# Patient Record
Sex: FEMALE | Race: WHITE | NOT HISPANIC OR LATINO | Employment: OTHER | URBAN - NONMETROPOLITAN AREA
[De-identification: names, ages, dates, MRNs, and addresses within clinical notes are randomized per-mention and may not be internally consistent; named-entity substitution may affect disease eponyms.]

---

## 2017-08-23 ENCOUNTER — TRANSCRIBE ORDERS (OUTPATIENT)
Dept: ADMINISTRATIVE | Facility: HOSPITAL | Age: 62
End: 2017-08-23

## 2017-08-23 DIAGNOSIS — Z12.31 VISIT FOR SCREENING MAMMOGRAM: Primary | ICD-10-CM

## 2017-12-12 ENCOUNTER — OFFICE VISIT (OUTPATIENT)
Dept: CARDIOLOGY | Facility: CLINIC | Age: 62
End: 2017-12-12

## 2017-12-12 VITALS
HEART RATE: 92 BPM | BODY MASS INDEX: 30.39 KG/M2 | DIASTOLIC BLOOD PRESSURE: 70 MMHG | WEIGHT: 193.6 LBS | SYSTOLIC BLOOD PRESSURE: 130 MMHG | HEIGHT: 67 IN

## 2017-12-12 DIAGNOSIS — I25.10 CORONARY ARTERY DISEASE INVOLVING NATIVE CORONARY ARTERY OF NATIVE HEART WITHOUT ANGINA PECTORIS: ICD-10-CM

## 2017-12-12 DIAGNOSIS — E78.5 DYSLIPIDEMIA: ICD-10-CM

## 2017-12-12 DIAGNOSIS — I10 ESSENTIAL HYPERTENSION: ICD-10-CM

## 2017-12-12 DIAGNOSIS — I77.9 LEFT-SIDED CAROTID ARTERY DISEASE (HCC): Primary | ICD-10-CM

## 2017-12-12 PROCEDURE — 99213 OFFICE O/P EST LOW 20 MIN: CPT | Performed by: INTERNAL MEDICINE

## 2017-12-12 RX ORDER — INSULIN GLARGINE 100 [IU]/ML
90 INJECTION, SOLUTION SUBCUTANEOUS NIGHTLY
Refills: 1 | COMMUNITY
Start: 2017-11-21 | End: 2021-02-15 | Stop reason: SDUPTHER

## 2017-12-12 NOTE — PROGRESS NOTES
OFFICE FOLLOW UP     Date of Encounter:2017     Name: Pily Martinez  : 1955  Address: [address] [place] [address] [place] [address]  Phone: [phone]    PCP: Savi Moreira MD  [address] [place] [address] [address] [place] [address]    Pily Martinez is a 62 y.o. female.      Chief Complaint: Follow up of Carotid artery stenosis, CAD, HTN, HLD    Problem List:   1. Carotid artery disease.  a.  Asymptomatic.  b. Chronically occluded LICA.  c. Carotid duplex, 2013:   of LICA, peak velocity ELLE = 134.  d. Carotid duplex, 2016:  LICA, ELLE 50-69%  2. Coronary artery disease.  a.  Probable angina pectoris.   b. Abnormal nuclear myocardial perfusion study, 2014.    c. Normal left ventricular function.   d. Everolimus eluting stents, proximal LAD and mid-circumflex, 2014.    e. Nonobstructive by catheterization, .  f.  Normal nuclear myocardial perfusion study, 2012.  g. [facility] admission for , echo 2014:  EF 60%, no VHD, no wall motion abnormalities.  3.  Asymptomatic bradycardia and rare atrial tachycardia.  a. Evaluated  by Tyson Cardenas MD --not PPM candidate.  4. Diabetes mellitus,  insulin requiring.  5. Hypertension.  6. Dyslipidemia.  7. Family history of coronary artery disease.  8. Gastroesophageal reflux disease.  9. Fibromyalgia.  10. History of  ?pernicious anemia.  11. Status post operations, remote.    Allergies   Allergen Reactions   • Benicar [Olmesartan]    • Lipitor [Atorvastatin]        Current Medications:  •  aspirin  MG by mouth Every Other Day.  •  clopidogrel 75 mg by mouth daily.   •  Duloxetine 60 mg by mouth 2 (two) times a day.  •  glyBURIDE-metFORMIN (GLUCOVANCE) 5-500 MG, 2 tablets by mouth 2 (two) times a day  •  Insulin Aspart (NOVOLOG FLEXPEN SC), Inject 12 Units under the skin 3 (Three) Times a Day.  •  Insulin Glargine (BASAGLAR KWIKPEN) 100 UNIT/ML injection pen, 66 Units Daily.  •  isosorbide mononitrate 30 mg by mouth daily  •  metoprolol  "tartrate 50 MG BY MOUTH TWICE A DAY  •  nitroglycerin 0.4 MG SL tablet as needed for chest pain.   •  rosuvastatin 40 MG by mouth daily.     History of Present Illness:   The patient returns for follow-up today.  Over the last several months her job was phased out and she is now not formally working.  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   She is now receiving disability but is upset and concerned about her lack of mental activity and thinks that her memory might not be what it was a few years ago.  She has not had transient or permanent focal neurologic deficits.  She has rare atypical chest pain not suggesting angina.  She has not had palpitations, presyncope, syncope or symptoms of claudication.      The following portions of the patient's history were reviewed and updated as appropriate: allergies, current medications and problem list.    HPI: Pertinent positives as listed in the HPI.  All other systems reviewed and negative.    Objective:    Vitals:    12/12/17 1303 12/12/17 1312   BP: 125/77 130/70   BP Location: Right arm Right arm   Patient Position: Sitting Standing   Pulse: 90 92   Weight: 87.8 kg (193 lb 9.6 oz)    Height: 170.2 cm (67\")        Physical Exam:  GENERAL: Alert, cooperative, in no acute distress.   HEENT: Fundoscopic deferred, otherwise unremarkable.  NECK: No Jugular venous distention, adenopathy, or thyromegaly noted.   HEART: Regular rhythm, normal rate, and no murmurs, gallops, or rubs.   LUNGS: Clear to auscultation bilaterally. No wheezing, rales or ronchi.  ABDOMEN: Flat without evidence of organomegaly, masses, or tenderness.  NEUROLOGIC: No focal abnormalities involving strength or sensation are noted.   EXTREMITIES: No clubbing, cyanosis, or edema noted.     Diagnostic Data:  No new labs available.    Procedures      Assessment and Plan: I think from a vascular status the patient is doing well.  She is on " best medical treatment.  She is seeing a diabetic specialist in [place] whose name she cannot remember.  She is encouraged to continue this.  I also talked to her about the possible relationship of that occluded internal carotid artery with cognitive function.  Data in this area is unclear; however, I did strongly suggest that she engage in mental acuity building activities on a daily basis and we discussed this in detail.  Assuming that symptoms of carotid and heart disease remained controlled, I would just like to see her back in [place] in one year for a carotid duplex to reassess her carotid disease.  We will arrange this on a day when she comes to visit her diabetic specialist because of transportation issues.  She understands she can contact us at any time if interim problems develop.    Scribed for Vincent Bearden MD by Laurence Lizarraga RN. 12/12/2017 1:10 PM.  I, Vincent Bearden MD, [facility], [facility], personally performed the services described in this documentation as scribed by the above named individual in my presence, and it is both accurate and complete. At 1:23 PM on 12/12/2017        EMR Dragon/Transcription Disclaimer:  Much of this encounter note is an electronic transcription/translation of spoken language to printed text.  The electronic translation of spoken language may permit erroneous, or at times, nonsensical words or phrases to be inadvertently transcribed.  Although I have reviewed the note for such errors, some may still exist.

## 2018-09-27 ENCOUNTER — APPOINTMENT (OUTPATIENT)
Dept: MAMMOGRAPHY | Facility: HOSPITAL | Age: 63
End: 2018-09-27

## 2018-10-03 DIAGNOSIS — I65.22 STENOSIS OF LEFT CAROTID ARTERY: Primary | ICD-10-CM

## 2018-11-16 ENCOUNTER — APPOINTMENT (OUTPATIENT)
Dept: MAMMOGRAPHY | Facility: HOSPITAL | Age: 63
End: 2018-11-16

## 2018-12-13 ENCOUNTER — OFFICE VISIT (OUTPATIENT)
Dept: CARDIOLOGY | Facility: CLINIC | Age: 63
End: 2018-12-13

## 2018-12-13 ENCOUNTER — HOSPITAL ENCOUNTER (OUTPATIENT)
Dept: CARDIOLOGY | Facility: HOSPITAL | Age: 63
Discharge: HOME | End: 2018-12-13
Attending: INTERNAL MEDICINE | Admitting: INTERNAL MEDICINE

## 2018-12-13 VITALS
WEIGHT: 202.4 LBS | SYSTOLIC BLOOD PRESSURE: 156 MMHG | HEIGHT: 67 IN | BODY MASS INDEX: 31.77 KG/M2 | DIASTOLIC BLOOD PRESSURE: 70 MMHG | HEART RATE: 78 BPM

## 2018-12-13 DIAGNOSIS — I25.10 CORONARY ARTERY DISEASE INVOLVING NATIVE CORONARY ARTERY OF NATIVE HEART WITHOUT ANGINA PECTORIS: Primary | ICD-10-CM

## 2018-12-13 DIAGNOSIS — I10 ESSENTIAL HYPERTENSION: ICD-10-CM

## 2018-12-13 DIAGNOSIS — E78.5 DYSLIPIDEMIA: ICD-10-CM

## 2018-12-13 DIAGNOSIS — I65.22 STENOSIS OF LEFT CAROTID ARTERY: ICD-10-CM

## 2018-12-13 LAB
BH CV XLRA MEAS LEFT DIST CCA EDV: 12.6 CM/SEC
BH CV XLRA MEAS LEFT DIST CCA PSV: 39.5 CM/SEC
BH CV XLRA MEAS LEFT MID CCA EDV: 9.4 CM/SEC
BH CV XLRA MEAS LEFT MID CCA PSV: 53.1 CM/SEC
BH CV XLRA MEAS LEFT PROX ECA PSV: 322.6 CM/SEC
BH CV XLRA MEAS LEFT PROX SCLA PSV: 251.4 CM/SEC
BH CV XLRA MEAS LEFT VERTEBRAL A PSV: 88.7 CM/SEC
BH CV XLRA MEAS RIGHT CCA RATIO VEL: 79.6 CM/SEC
BH CV XLRA MEAS RIGHT DIST CCA EDV: 26.5 CM/SEC
BH CV XLRA MEAS RIGHT DIST CCA PSV: 80.3 CM/SEC
BH CV XLRA MEAS RIGHT DIST ICA EDV: 62.9 CM/SEC
BH CV XLRA MEAS RIGHT DIST ICA PSV: 162.8 CM/SEC
BH CV XLRA MEAS RIGHT ICA RATIO VEL: 199 CM/SEC
BH CV XLRA MEAS RIGHT ICA/CCA RATIO: 2.5
BH CV XLRA MEAS RIGHT MID CCA EDV: 25.9 CM/SEC
BH CV XLRA MEAS RIGHT MID CCA PSV: 88 CM/SEC
BH CV XLRA MEAS RIGHT MID ICA EDV: 69.6 CM/SEC
BH CV XLRA MEAS RIGHT MID ICA PSV: 199.8 CM/SEC
BH CV XLRA MEAS RIGHT PROX CCA EDV: 22.6 CM/SEC
BH CV XLRA MEAS RIGHT PROX CCA PSV: 109 CM/SEC
BH CV XLRA MEAS RIGHT PROX ECA PSV: 304.9 CM/SEC
BH CV XLRA MEAS RIGHT PROX ICA EDV: 40.4 CM/SEC
BH CV XLRA MEAS RIGHT PROX ICA PSV: 154.9 CM/SEC
BH CV XLRA MEAS RIGHT PROX SCLA PSV: 157.1 CM/SEC
BH CV XLRA MEAS RIGHT VERTEBRAL A PSV: 83.1 CM/SEC
LEFT ARM BP: NORMAL MMHG
RIGHT ARM BP: NORMAL MMHG

## 2018-12-13 PROCEDURE — 93880 EXTRACRANIAL BILAT STUDY: CPT | Performed by: INTERNAL MEDICINE

## 2018-12-13 PROCEDURE — 93880 EXTRACRANIAL BILAT STUDY: CPT

## 2018-12-13 PROCEDURE — 99214 OFFICE O/P EST MOD 30 MIN: CPT | Performed by: INTERNAL MEDICINE

## 2018-12-13 RX ORDER — LISINOPRIL 5 MG/1
5 TABLET ORAL DAILY
Qty: 30 TABLET | Refills: 11 | Status: SHIPPED | OUTPATIENT
Start: 2018-12-13 | End: 2019-12-17

## 2018-12-13 RX ORDER — ALBUTEROL SULFATE 90 UG/1
2 AEROSOL, METERED RESPIRATORY (INHALATION) AS NEEDED
COMMUNITY

## 2018-12-13 RX ORDER — MULTIVITAMIN WITH IRON
100 TABLET ORAL DAILY
COMMUNITY
End: 2020-12-22

## 2018-12-13 RX ORDER — THIAMINE MONONITRATE (VIT B1) 100 MG
100 TABLET ORAL DAILY
COMMUNITY
End: 2019-12-17

## 2019-02-01 ENCOUNTER — HOSPITAL ENCOUNTER (OUTPATIENT)
Dept: MAMMOGRAPHY | Facility: HOSPITAL | Age: 64
Discharge: HOME | End: 2019-02-01
Admitting: INTERNAL MEDICINE

## 2019-02-01 DIAGNOSIS — Z12.39 SCREENING BREAST EXAMINATION: ICD-10-CM

## 2019-02-01 PROCEDURE — 77067 SCR MAMMO BI INCL CAD: CPT | Performed by: RADIOLOGY

## 2019-02-01 PROCEDURE — 77063 BREAST TOMOSYNTHESIS BI: CPT

## 2019-02-01 PROCEDURE — 77067 SCR MAMMO BI INCL CAD: CPT

## 2019-02-01 PROCEDURE — 77063 BREAST TOMOSYNTHESIS BI: CPT | Performed by: RADIOLOGY

## 2019-02-14 ENCOUNTER — TELEPHONE (OUTPATIENT)
Dept: CARDIOLOGY | Facility: CLINIC | Age: 64
End: 2019-02-14

## 2019-02-14 NOTE — TELEPHONE ENCOUNTER
Pt called to report she has stopped her lisinopril due to cough. Last BP with her PCP was 123/78. She does not have cuff to check BP at home. She was advised to get a cuff or go to pharmacy 2-3 times a week and keep record of BP. If SBP >30 consistently would attempt another HTN med. She is cleared to hold Plavix 5-7 days for tooth extraction if dentist requires. DO NOT stop ASA. Pt aware and agreeable. EVERT

## 2019-12-17 ENCOUNTER — OFFICE VISIT (OUTPATIENT)
Dept: CARDIOLOGY | Facility: CLINIC | Age: 64
End: 2019-12-17

## 2019-12-17 VITALS
BODY MASS INDEX: 28.72 KG/M2 | DIASTOLIC BLOOD PRESSURE: 65 MMHG | HEART RATE: 77 BPM | HEIGHT: 67 IN | SYSTOLIC BLOOD PRESSURE: 129 MMHG | WEIGHT: 183 LBS

## 2019-12-17 DIAGNOSIS — E78.5 DYSLIPIDEMIA: ICD-10-CM

## 2019-12-17 DIAGNOSIS — I65.22 OCCLUSION OF LEFT CAROTID ARTERY: ICD-10-CM

## 2019-12-17 DIAGNOSIS — I10 ESSENTIAL HYPERTENSION: ICD-10-CM

## 2019-12-17 DIAGNOSIS — I25.10 CORONARY ARTERY DISEASE INVOLVING NATIVE CORONARY ARTERY OF NATIVE HEART WITHOUT ANGINA PECTORIS: Primary | ICD-10-CM

## 2019-12-17 PROCEDURE — 99214 OFFICE O/P EST MOD 30 MIN: CPT | Performed by: PHYSICIAN ASSISTANT

## 2019-12-17 RX ORDER — LORATADINE 10 MG/1
CAPSULE, LIQUID FILLED ORAL DAILY
COMMUNITY

## 2019-12-17 RX ORDER — NITROGLYCERIN 0.4 MG/1
0.4 TABLET SUBLINGUAL
Qty: 25 TABLET | Refills: 11 | Status: SHIPPED | OUTPATIENT
Start: 2019-12-17

## 2019-12-17 RX ORDER — CLOPIDOGREL BISULFATE 75 MG/1
75 TABLET ORAL DAILY
Qty: 90 TABLET | Refills: 3 | Status: SHIPPED | OUTPATIENT
Start: 2019-12-17 | End: 2020-12-18

## 2019-12-17 NOTE — PROGRESS NOTES
OFFICE FOLLOW UP     Date of Encounter:2019     Name: Pily Martinez  : 1955  Address: [address] [place] [address] [place] [address]    PCP: Savi Moreira MD  [address] [facility] [address] [place] [address]    Pily Martinez is a 64 y.o. female.    Chief Complaint: Follow up of Carotid stenosis, CAD, HTN, HLD    Problem List:   1. Carotid artery disease.  a.  Asymptomatic.  b. Chronically occluded LICA.  c. Carotid duplex, 2013:   of LICA, peak velocity ELLE = 134.  d. Carotid duplex, 2016:  LICA, ELLE 50-69%  e. Carotid duplex 2018:  of LICA, since 2016, velocities in ELLE are slightly increased with no change in ICA/CCA and velocity criteria consistent with <70% stenosis   2. Coronary artery disease.  a.  Probable angina pectoris.   b. Abnormal nuclear myocardial perfusion study, 2014.    c. Normal left ventricular function.   d. Everolimus eluting stents, proximal LAD and mid-circumflex, 2014.    e. Nonobstructive by catheterization, .  f.  Normal nuclear myocardial perfusion study, 2012.  g. [facility] admission for CP, echo 2014:  EF 60%, no VHD, no wall motion abnormalities.  3.  Asymptomatic bradycardia and rare atrial tachycardia.  a. Evaluated  by Tyson Cardenas MD --not PPM candidate.  4. Diabetes mellitus,  insulin requiring.  5. Hypertension.  6. Dyslipidemia.  7. Family history of coronary artery disease.  8. Gastroesophageal reflux disease.  9. Fibromyalgia.  10. History of  ?pernicious anemia.  11. Status post operations, remote  Allergies:  Allergies   Allergen Reactions   • Benicar [Olmesartan]    • Lipitor [Atorvastatin]      Current Medications:  •  albuterol 108 (90 Base) MCG/ACT inhaler, Inhale 2 puffs 3 (Three) Times a Day.  •  aspirin 81 MG EC tablet, Take 81 mg by mouth Daily.  •  Cholecalciferol (VITAMIN D3) 37998 units tablet, Take  by mouth 1 (One) Time Per Week  •  clopidogrel (PLAVIX) 75 MG tablet, Take 75 mg by mouth daily.  •  Cyanocobalamin  1000 MCG/ML kit, Inject  as directed Every 30 (Thirty) Days.  •  glyBURIDE-metFORMIN (GLUCOVANCE) 5-500 MG per tablet, Take 2 tablets by mouth 2 (two) times a day with meals  •  Insulin Aspart (NOVOLOG FLEXPEN SC), Inject 16 Units under the skin into the appropriate area as directed 3 (Three) Times a Day  •  Insulin Glargine (BASAGLAR KWIKPEN) 100 UNIT/ML injection pen, 90 Units Every Night.  •  Loratadine (CLARITIN) 10 MG capsule, Take  by mouth Daily  •  metoprolol tartrate (LOPRESSOR) 50 MG tablet, TAKE 1 TABLET BY MOUTH TWICE A DAY  •  Misc Natural Products (TART CHERRY ADVANCED PO), Take  by mouth Daily.  •  nitroglycerin (NITROSTAT) 0.4 MG SL tablet, Place 0.4 mg under the tongue every 5 (five) minutes as needed for chest pain. Take no more than 3 doses in 15 minutes.  •  rosuvastatin (CRESTOR) 40 MG tablet, Take  by mouth daily  •  Semaglutide, 1 MG/DOSE, (OZEMPIC) 2 MG/1.5ML solution pen-injector, Inject  under the skin into the appropriate area as directed 1 (One) Time Per Week.  •  TURMERIC PO, Take  by mouth 2 (Two) Times a Day.  •  vitamin B-6 (PYRIDOXINE) 100 MG tablet, Take 100 mg by mouth Daily    History of Present Illness:            Mrs. Martinez returns today for yearly follow up. She denies any cardiovascular or cerebrovascular symptoms since her last visit. She denies chest pain or angina, exertional dyspnea, syncope or heart failure symptoms. She has not had any focal neurological deficits. She does have some dizziness with position changes such as sitting to standing. She states she drinks several diet Dr. Pepper drinks per day and not enough water. She also reports her diabetes is not under good control.     The following portions of the patient's history were reviewed and updated as appropriate: allergies, current medications and problem list.    ROS: Pertinent positives as listed in the HPI.  All other systems reviewed and negative.    Objective:  Vitals:    12/17/19 1048 12/17/19 1053   BP:  "153/77 107/59   BP Location: Right arm Right arm   Patient Position: Sitting Standing   Pulse: 77 79   Weight: 83 kg (183 lb)    Height: 170.2 cm (67\")      Physical Exam:  GENERAL: Alert, cooperative, in no acute distress.   HEENT: Normocephalic, no adenopathy, no jugular venous distention  HEART: No discrete PMI is noted. Regular rhythm, normal rate, and no murmurs, gallops, or rubs.   LUNGS: Clear to auscultation bilaterally. No wheezing, rales or ronchi.  ABDOMEN: Soft, bowel sounds present, non-tender   NEUROLOGIC: No focal abnormalities involving strength or sensation are noted.   EXTREMITIES: No clubbing, cyanosis, or edema noted.     Diagnostic Data:    No new labs available     Carotid duplex 12/2018:  Interpretation Summary     · The left internal carotid artery is occluded.  · Since September 2016, the right internal carotid artery velocities are slightly with no change in ICA/CCA and velocity criteria c/w less than 70% stenosis.       Procedures    Assessment and Plan:   1.  CAD: active and asymptomatic without angina. Continue DAPT and statin.   2.  HTN: orthostatic today and mildly symptomatic with dizziness. Has not tolerated Lisinopril or Losartan in the past. Have asked her to decrease her soda/caffeine intake, and to increase water or sugar free Gatorade intake to stay well hydrated. She understands orthostatic precautions to avoid syncope and was given the instruction sheet today.   3.  HLD: Continue Crestor to target LDL <70, followed by PCP.   4.  Carotid stenosis/  LICA: she has remained asymptomatic without focal neurological defects. Her last carotid duplex is as noted above. She will return for follow up in 1 year with repeat carotid duplex at that time.   5. Follow up in 1 year with carotid duplex at that time, sooner if needed.     Emily Gamino PA-C                   "

## 2020-07-06 NOTE — PROGRESS NOTES
OFFICE FOLLOW UP     Date of Encounter:2018     Name: Pily Martinez  : 1955  Address: [address] [place] [address] [place] [address]  Home Phone:     PCP: Savi Moreira MD  [address] [place] [address] [address] [place] [address]    Pily Martinez is a 63 y.o. female.      Chief Complaint: Follow up of CAD, carotid stenosis, HTN, HLD    Problem List:   1. Carotid artery disease.  a.  Asymptomatic.  b. Chronically occluded LICA.  c. Carotid duplex, 2013:   of LICA, peak velocity ELLE = 134.  d. Carotid duplex, 2016:  LICA, ELLE 50-69%  2. Coronary artery disease.  a.  Probable angina pectoris.   b. Abnormal nuclear myocardial perfusion study, 2014.    c. Normal left ventricular function.   d. Everolimus eluting stents, proximal LAD and mid-circumflex, 2014.    e. Nonobstructive by catheterization, .  f.  Normal nuclear myocardial perfusion study, 2012.  g. [facility] admission for , echo 2014:  EF 60%, no VHD, no wall motion abnormalities.  3.  Asymptomatic bradycardia and rare atrial tachycardia.  a. Evaluated  by Tyson Cardenas MD --not PPM candidate.  4. Diabetes mellitus,  insulin requiring.  5. Hypertension.  6. Dyslipidemia.  7. Family history of coronary artery disease.  8. Gastroesophageal reflux disease.  9. Fibromyalgia.  10. History of  ?pernicious anemia.  11. Status post operations, remote    Allergies   Allergen Reactions   • Benicar [Olmesartan]    • Lipitor [Atorvastatin]        Current Medications:  •  albuterol 108 (90 Base) MCG/ACT inhaler, Inhale 2 puffs 3 (Three) Times a Day.  •  ALPHA LIPOIC ACID-BIOTIN 600 mg by mouth Daily  •  aspirin  mg by mouth Every Other Day.  •  Cholecalcifero 22705 units by mouth 1 (One) Time Per Week   •  clopidogrel 75 mg by mouth daily.  •  Cyanocobalamin 1000 MCG/ML kit, Inject  as directed Every 30 (Thirty) Days.  •  glyBURIDE-metFORMIN (GLUCOVANCE) 5-500 MG Take 2 tablets by mouth 2 times a day   •  Insulin Aspart (NOVOLOG  Physical Therapy Evaluation    Visit Count: 1     Referred by: Miguel Lugo MD; Next provider visit (if known/scheduled): TBD  Medical Diagnosis (from order):   Diagnosis Information      Diagnosis    388.72 (ICD-9-CM) - H92.01 (ICD-10-CM) - Referred otalgia of right ear            Treatment Diagnosis: right BPPV symptoms with Right Benign Paroxymal Positional Vertigo (BPPV)    Date of onset/injury: March 2020, wasn't able to drive   Diagnosis Precautions: right shoulder pain, scheduled for TSA 8/6/20  Chart reviewed at time of initial evaluation (relevant co-morbidities, allergies, tests and medications listed): has had vertigo in the 10 years was treated with PT     SUBJECTIVE   Description of Problem and/or Mechanism of Injury: vertigo with position changes, less intense now, able to drive, notices with turning in bed    Current Symptoms:   Auditory Symptoms: Pain, Dr. Lugo thought ear pain was referred from shoulder   Affected Ear: Right  Visual Symptoms: None  Corrective Lenses: progressives.  Recent change in medication: No   Vertigo Medication: None  Migraines/Headache/eye strain: Yes, episodic  History of concussion: No  Description of dizziness: Spinning or vertigo   Duration of Symptoms: short duration    Function:  Limitations and exacerbating factors: difficulty with position changes  Prior level: no vertigo  Patient Goals: resolve dizziness with position changes.    Prior Treatment: no therapies in the past year for current condition. Hospitalization, home health services or skilled nursing facility in the last 30 days: No, per patient.  Home Environment/Social Support: Patient lives with significant other.  Patient has assistance as needed from family/friends.    Safety:  Do you feel safe at home, work and/or school? yes, per patient  Patient denies 2 or more falls or an unexplained fall with injury in the last year, further testing not required     OBJECTIVE    Posture/Observation/Gait:  Ocular alignment: WNL   Cervical positioning/posture:  WNL       Special Tests:  LeftRightModified Vertebral Artery Screen for vertebral artery insufficiency    Positive test indicated with an \"X\"      Cervical Range of Motion (degrees)  Date Initial    Flexion (80-90) WNL    Extension (70) WNL    Lateral Flexion Left (20-45)    Lateral Flexion Right (20-45)    Rotation Left (70-90)  WNL    Rotation Rigth (70-90)   WNL    standard testing positions unless otherwise noted; ranges are reported in active range of motion unless noted AA=active assistive range of motion and P=passive range of motion; norms included in (); * =pain  All motions within functional/normal limits except as noted.     Oculomotor Exam   Present Comments   Spontaneous Nystagmus     Gaze Holding Nystagmus         Left         Right         Up         Down      Abnormal    Smooth Pursuits     Saccade     Impairment findings indicated with an \"X\"    Vestibular Exam:   Head Impulse Test: Positive left- mildly    Positional Exam:   Indra-Hallpike Test   (anterior and posterior canal) Result       Left Negative       Right Positive:  Nystagmus duration: 8 sec  Nystagmus direction: up right       Roll Test (horizontal canal)        Left Negative       Right Negative   Comments:      Balance/Gait Test:  Walking with horizontal head turns WNL, walking with vertical head movements results in dizziness symptoms.    Outcome Measures: (Outcome Scoring)  Dizziness Handicap Inventory: 28 (scored 0-100, higher score indicates higher impairment)      Initial Treatment   Initial evaluation completed.    Canalith Repositioning:  CRT for right PC x 2    Skilled input: verbal instruction/cues, education/instruction on BPPV, clinical decision-making and application of CRT    Home Program:  * above=instructed home program    BPPV education with self CRT and written reinforcement provided     Writer verbally educated the patient and received  "FLEXPEN SC), Inject 16 Units under the skin as directed 3 Times a Day  •  Insulin Glargine (BASAGLAR KWIKPEN) 100 UNIT/ML 90 Units Every Night.  •  metoprolol tartrate 50 MG BY MOUTH TWICE A DAY  •  nitroglycerin 0.4 MG SL as needed for chest pain.   •  rosuvastatin 40 MG by mouth daily  •  thiamine 100 MG  by mouth Daily.  •  vitamin B-6 50 mg by mouth Daily.    History of Present Illness:  The patient returns for follow-up today.  Since her last visit she stopped Imdur and losartan since she is certain that both of these medications cause headaches.  She does not take home blood pressure readings.  She states that her diabetes is not under control although she does have a diabetic specialist in [facility] (whose name she does not recall this afternoon).  She has had no angina or symptoms of congestive heart failure.              The following portions of the patient's history were reviewed and updated as appropriate: allergies, current medications and problem list.    ROS: Pertinent positives as listed in the HPI.  All other systems reviewed and negative.    Objective:    Vitals:    12/13/18 1337 12/13/18 1339   BP: 156/80 156/70   BP Location: Right arm Right arm   Patient Position: Sitting Standing   Pulse: 76 78   Weight: 91.8 kg (202 lb 6.4 oz)    Height: 170.2 cm (67\")        Physical Exam:  GENERAL: Alert, cooperative, in no acute distress.   HEENT: Normocephalic, no adenopathy, no jugular venous distention  HEART: No discrete PMI is noted. Regular rhythm, normal rate, and no murmurs, gallops, or rubs.   LUNGS: Clear to auscultation bilaterally. No wheezing, rales or ronchi.  ABDOMEN: Soft, bowel sounds present, non-tender   NEUROLOGIC: No focal abnormalities involving strength or sensation are noted.   EXTREMITIES: No clubbing, cyanosis, or edema noted.     Diagnostic Data:  No new labs available to review.     Procedures      Assessment and Plan:   1.  CAD: NYHA I on medical treatment after " revascularization.  2.  HTN: Above target (130 mmHg). Will try Lisinopril, 5 mg by mouth daily and ask for follow-up with her primary care provider to target a systolic blood pressure of less than 130.  3.  HLD:  Her target LDL is less than 70 and she understands this.  4.  DM: She is STRONGLY urger to visit her established diabetologist as needed for control to target.    I, Vincent Bearden MD, personally performed the services described as documented by the above named individual. I have made any necessary edits and it is both accurate and complete 12/13/2018  2:51 PM    I will see Pily Martinez back in one year or sooner on an as needed basis.       Scribed for Vincent Bearden MD by Laurence Lizarraga RN. 12/13/2018 1:47 PM.        EMR Dragon/Transcription Disclaimer:  Much of this encounter note is an electronic transcription/translation of spoken language to printed text.  The electronic translation of spoken language may permit erroneous, or at times, nonsensical words or phrases to be inadvertently transcribed.  Although I have reviewed the note for such errors, some may still exist.              verbal consent from the patient on hand placement, positioning of patient, and techniques to be performed today including hand placement and palpation for techniques as described above and how they are pertinent to the patient's plan of care.     Suggestions for next session as indicated: progress per plan of care, CRT for 1-2 more visits.     ASSESSMENT   54 year old female patient has signs and symptoms consistent with above diagnosis that has reported functional limitations listed above.     Patient will benefit from skilled therapy and rehab potential is good based on assessment above   Predicted patient presentation: Low (stable) - Patient comorbidities and complexities, as defined above, will have little effect on progress for prescribed plan of care.    PLAN   Goals: To be obtained by end of this plan of care:  1. Patient independent with modified and progressed home exercise program.  2. Patient will subjectively report no symptoms of dizziness, nausea, dysequilibrium with head motions and position changes  through improvements listed above patient will:  3. Safely negotiate stairs, curbs and be able to exercise without loss of balance  4. Dizziness Handicap Inventory: Patient will complete form to reflect an improved score from initial score of 28 to less than or equal to 4 to indicate patient reported improvement in function/disability/impairment (minimal detectable change: 17 points).    The following skilled interventions to be implemented to achieve above:  Manual Therapy (08212)  Neuromuscular Re-Education (29116)  Therapeutic Activity (55823)  Therapeutic Exercise (54791)  Canalith Repositioning (93271)    Frequency/Duration: 1 times per week for 6 weeks with tapering as the patient progresses for an estimated total of 4 visits    patient involved in and agreed to plan of care and goals.   Attendance policy provided at time of evaluation.      Patient Education:   Who will be receiving education:  patient  Are they ready to learn: yes  Preferred learning style: written, verbal, demonstration  Barriers to learning: no barriers apparent at this time   Result of initial outlined education: Verbalizes understanding and Demonstrates understanding    Procedures and total treatment time documented in Time Entry flowsheet.

## 2020-07-14 ENCOUNTER — HOSPITAL ENCOUNTER (OUTPATIENT)
Dept: MAMMOGRAPHY | Facility: HOSPITAL | Age: 65
Discharge: HOME | End: 2020-07-14
Admitting: INTERNAL MEDICINE

## 2020-07-14 DIAGNOSIS — Z12.31 VISIT FOR SCREENING MAMMOGRAM: ICD-10-CM

## 2020-07-14 PROCEDURE — 77067 SCR MAMMO BI INCL CAD: CPT

## 2020-07-14 PROCEDURE — 77063 BREAST TOMOSYNTHESIS BI: CPT | Performed by: RADIOLOGY

## 2020-07-14 PROCEDURE — 77063 BREAST TOMOSYNTHESIS BI: CPT

## 2020-07-14 PROCEDURE — 77067 SCR MAMMO BI INCL CAD: CPT | Performed by: RADIOLOGY

## 2020-09-28 ENCOUNTER — TRANSCRIBE ORDERS (OUTPATIENT)
Dept: ADMINISTRATIVE | Facility: HOSPITAL | Age: 65
End: 2020-09-28

## 2020-09-28 DIAGNOSIS — Z01.818 PREOP EXAMINATION: Primary | ICD-10-CM

## 2020-09-29 ENCOUNTER — LAB (OUTPATIENT)
Dept: LAB | Facility: HOSPITAL | Age: 65
End: 2020-09-29

## 2020-09-29 DIAGNOSIS — Z01.818 PREOP EXAMINATION: ICD-10-CM

## 2020-09-29 PROCEDURE — C9803 HOPD COVID-19 SPEC COLLECT: HCPCS

## 2020-09-29 PROCEDURE — U0004 COV-19 TEST NON-CDC HGH THRU: HCPCS

## 2020-09-30 LAB — SARS-COV-2 RNA NOSE QL NAA+PROBE: NOT DETECTED

## 2020-12-18 RX ORDER — CLOPIDOGREL BISULFATE 75 MG/1
TABLET ORAL
Qty: 90 TABLET | Refills: 0 | Status: SHIPPED | OUTPATIENT
Start: 2020-12-18 | End: 2020-12-22 | Stop reason: SDUPTHER

## 2020-12-22 ENCOUNTER — OFFICE VISIT (OUTPATIENT)
Dept: CARDIOLOGY | Facility: CLINIC | Age: 65
End: 2020-12-22

## 2020-12-22 ENCOUNTER — HOSPITAL ENCOUNTER (OUTPATIENT)
Dept: CARDIOLOGY | Facility: HOSPITAL | Age: 65
Discharge: HOME | End: 2020-12-22
Admitting: PHYSICIAN ASSISTANT

## 2020-12-22 VITALS
DIASTOLIC BLOOD PRESSURE: 62 MMHG | HEART RATE: 93 BPM | SYSTOLIC BLOOD PRESSURE: 112 MMHG | BODY MASS INDEX: 27.31 KG/M2 | OXYGEN SATURATION: 98 % | HEIGHT: 67 IN | WEIGHT: 174 LBS

## 2020-12-22 DIAGNOSIS — E78.5 DYSLIPIDEMIA: ICD-10-CM

## 2020-12-22 DIAGNOSIS — I10 ESSENTIAL HYPERTENSION: ICD-10-CM

## 2020-12-22 DIAGNOSIS — I25.10 CORONARY ARTERY DISEASE INVOLVING NATIVE CORONARY ARTERY OF NATIVE HEART WITHOUT ANGINA PECTORIS: ICD-10-CM

## 2020-12-22 DIAGNOSIS — I65.22 OCCLUSION OF LEFT CAROTID ARTERY: ICD-10-CM

## 2020-12-22 DIAGNOSIS — I65.23 BILATERAL CAROTID ARTERY OCCLUSION: Primary | ICD-10-CM

## 2020-12-22 LAB
BH CV ECHO MEAS - BSA(HAYCOCK): 2 M^2
BH CV ECHO MEAS - BSA: 1.9 M^2
BH CV ECHO MEAS - BZI_BMI: 28.7 KILOGRAMS/M^2
BH CV ECHO MEAS - BZI_METRIC_HEIGHT: 170.2 CM
BH CV ECHO MEAS - BZI_METRIC_WEIGHT: 83 KG
BH CV XLRA MEAS LEFT DIST CCA EDV: 9.1 CM/SEC
BH CV XLRA MEAS LEFT DIST CCA PSV: 31.5 CM/SEC
BH CV XLRA MEAS LEFT ICA/CCA RATIO: 0
BH CV XLRA MEAS LEFT MID CCA EDV: 9.9 CM/SEC
BH CV XLRA MEAS LEFT MID CCA PSV: 58.8 CM/SEC
BH CV XLRA MEAS LEFT PROX CCA EDV: 0 CM/SEC
BH CV XLRA MEAS LEFT PROX CCA PSV: 73.6 CM/SEC
BH CV XLRA MEAS LEFT PROX ECA EDV: 62 CM/SEC
BH CV XLRA MEAS LEFT PROX ECA PSV: 279 CM/SEC
BH CV XLRA MEAS LEFT PROX ICA EDV: 0 CM/SEC
BH CV XLRA MEAS LEFT PROX ICA PSV: 0 CM/SEC
BH CV XLRA MEAS LEFT PROX SCLA EDV: 0 CM/SEC
BH CV XLRA MEAS LEFT PROX SCLA PSV: 283 CM/SEC
BH CV XLRA MEAS LEFT VERTEBRAL A EDV: 26 CM/SEC
BH CV XLRA MEAS LEFT VERTEBRAL A PSV: 95.3 CM/SEC
BH CV XLRA MEAS RIGHT DIST CCA EDV: 24.2 CM/SEC
BH CV XLRA MEAS RIGHT DIST CCA PSV: 73.6 CM/SEC
BH CV XLRA MEAS RIGHT DIST ICA EDV: 47.5 CM/SEC
BH CV XLRA MEAS RIGHT DIST ICA PSV: 140 CM/SEC
BH CV XLRA MEAS RIGHT ICA/CCA RATIO: 2.4
BH CV XLRA MEAS RIGHT MID CCA EDV: 19.4 CM/SEC
BH CV XLRA MEAS RIGHT MID CCA PSV: 79.4 CM/SEC
BH CV XLRA MEAS RIGHT MID ICA EDV: 47.5 CM/SEC
BH CV XLRA MEAS RIGHT MID ICA PSV: 144 CM/SEC
BH CV XLRA MEAS RIGHT PROX CCA EDV: 19.4 CM/SEC
BH CV XLRA MEAS RIGHT PROX CCA PSV: 99.7 CM/SEC
BH CV XLRA MEAS RIGHT PROX ECA EDV: 38.9 CM/SEC
BH CV XLRA MEAS RIGHT PROX ECA PSV: 449 CM/SEC
BH CV XLRA MEAS RIGHT PROX ICA EDV: 62 CM/SEC
BH CV XLRA MEAS RIGHT PROX ICA PSV: 189 CM/SEC
BH CV XLRA MEAS RIGHT PROX SCLA EDV: 0 CM/SEC
BH CV XLRA MEAS RIGHT PROX SCLA PSV: 333 CM/SEC
BH CV XLRA MEAS RIGHT VERTEBRAL A EDV: 22.4 CM/SEC
BH CV XLRA MEAS RIGHT VERTEBRAL A PSV: 64.6 CM/SEC
LEFT ARM BP: NORMAL MMHG
RIGHT ARM BP: NORMAL MMHG

## 2020-12-22 PROCEDURE — 93880 EXTRACRANIAL BILAT STUDY: CPT | Performed by: INTERNAL MEDICINE

## 2020-12-22 PROCEDURE — 99214 OFFICE O/P EST MOD 30 MIN: CPT | Performed by: INTERNAL MEDICINE

## 2020-12-22 PROCEDURE — 93880 EXTRACRANIAL BILAT STUDY: CPT

## 2020-12-22 RX ORDER — LEVOTHYROXINE SODIUM 0.03 MG/1
25 TABLET ORAL DAILY
COMMUNITY
End: 2022-05-18 | Stop reason: ALTCHOICE

## 2020-12-22 RX ORDER — MELATONIN
1000 DAILY
COMMUNITY

## 2020-12-22 RX ORDER — CLOPIDOGREL BISULFATE 75 MG/1
75 TABLET ORAL DAILY
Qty: 90 TABLET | Refills: 4 | Status: SHIPPED | OUTPATIENT
Start: 2020-12-22

## 2020-12-22 NOTE — PROGRESS NOTES
[facility] at [facility] - Office Note  Pily Martinez         [address] [place] [address]  1955   [phone] (home)      LOCATION:  [place] office.  Visit Type: Follow Up.    PCP:  Savi Moreira MD    12/22/20   Pily Martinez is a 65 y.o.  female  retired.      Chief Complaint:   FU on Carotid disease, CAD, HTN.    Problem List/PMHx:   1. Carotid artery disease.  a.  Asymptomatic.  b. Chronically occluded LICA.  c. Carotid duplex, July 2013:   of LICA, peak velocity ELLE = 134.  d. Carotid duplex, 9/2016:  LICA, ELLE 50-69%  e. Carotid duplex 12/2018:  of LICA, since 2016, velocities in ELLE are slightly increased with no change in ICA/CCA and velocity criteria consistent with <70% stenosis   2. Coronary artery disease.  a.  Probable angina pectoris.   b. Abnormal nuclear myocardial perfusion study, July 2014.    c. Normal left ventricular function.   d. Everolimus eluting stents, proximal LAD and mid-circumflex, 07/12/2014.    e. Nonobstructive by catheterization, 2009.  f.  Normal nuclear myocardial perfusion study, July 2012.  g. [facility] admission for CP, echo 06/06/2014:  EF 60%, no VHD, no wall motion abnormalities.  3.  Asymptomatic bradycardia and rare atrial tachycardia.  a. Evaluated  by Tyson Cardenas MD --not PPM candidate.  4. Diabetes mellitus,  insulin requiring.  5. Essential Hypertension.  6. Dyslipidemia.  7. Family history of coronary artery disease.  8. Gastroesophageal reflux disease.  9. Fibromyalgia.  10. History of  ?pernicious anemia.  11. Status post operations, remote      Allergies   Allergen Reactions   • Benicar [Olmesartan]    • Lipitor [Atorvastatin]          Current Outpatient Medications:   •  albuterol 108 (90 Base) MCG/ACT inhaler, Inhale 2 puffs 3 (Three) Times a Day., Disp: , Rfl:   •  aspirin 81 MG EC tablet, Take 81 mg by mouth Daily., Disp: , Rfl:   •  cholecalciferol (VITAMIN D3) 25 MCG (1000 UT) tablet,  Take 1,000 Units by mouth Daily., Disp: , Rfl:   •  clopidogrel (PLAVIX) 75 MG tablet, TAKE 1 TABLET BY MOUTH EVERY DAY, Disp: 90 tablet, Rfl: 0  •  glyBURIDE-metFORMIN (GLUCOVANCE) 5-500 MG per tablet, Take 2 tablets by mouth 2 (two) times a day with meals., Disp: , Rfl:   •  Insulin Aspart (NOVOLOG FLEXPEN SC), Inject 16 Units under the skin into the appropriate area as directed 3 (Three) Times a Day., Disp: , Rfl:   •  Insulin Glargine (BASAGLAR KWIKPEN) 100 UNIT/ML injection pen, 90 Units Every Night., Disp: , Rfl: 1  •  levothyroxine (SYNTHROID, LEVOTHROID) 25 MCG tablet, Take 25 mcg by mouth Daily., Disp: , Rfl:   •  Loratadine (CLARITIN) 10 MG capsule, Take  by mouth Daily., Disp: , Rfl:   •  metoprolol tartrate (LOPRESSOR) 50 MG tablet, TAKE 1 TABLET BY MOUTH TWICE A DAY, Disp: , Rfl: 1  •  nitroglycerin (NITROSTAT) 0.4 MG SL tablet, Place 1 tablet under the tongue Every 5 (Five) Minutes As Needed for Chest Pain. Take no more than 3 doses in 15 minutes., Disp: 25 tablet, Rfl: 11  •  Semaglutide, 1 MG/DOSE, (OZEMPIC) 2 MG/1.5ML solution pen-injector, Inject  under the skin into the appropriate area as directed 1 (One) Time Per Week., Disp: , Rfl:     HPI  Pily Martinez is a 66 yo CF here today for her annual follow up on Carotid disease, CAD, HTN, HLP.  Overall, she's doing well from a cardiac standpoint. She has no complaints of angina or anginal equivalent dyspnea, no CHF type symptoms, no palpitations or weakness.  She follows routinely with her PCP and gets routine blood work.  Her biggest issue recently has been her peripheral neuropathy.  She follows with podiatry and just had some work on several calluses.  No current infection.  She is a lot of pain walking particularly with hardwood floors at home.  She denies any falls or injuries to her feet.    Her other issue is that of acid reflux.  She is currently being followed by her PCP for this.      The following portions of the patient's history  "were reviewed in the chart and updated as appropriate: allergies, current medications, past family history, past medical history, past social history, past surgical history and problem list.    Review of Systems   Constitution: Negative for malaise/fatigue.   Cardiovascular: Negative for chest pain, dyspnea on exertion, leg swelling and palpitations.   Respiratory: Negative for cough and shortness of breath.    Hematologic/Lymphatic: Negative for bleeding problem. Does not bruise/bleed easily.   Skin: Negative for color change and poor wound healing.   Musculoskeletal: Positive for myalgias.   Gastrointestinal: Positive for heartburn and nausea.   All other systems reviewed and are negative.            height is 170.2 cm (67\") and weight is 78.9 kg (174 lb). Her blood pressure is 112/62 and her pulse is 93. Her oxygen saturation is 98%.    Vitals:    12/22/20 1021   BP: 112/62   BP Location: Right arm   Patient Position: Sitting   Pulse: 93   SpO2: 98%   Weight: 78.9 kg (174 lb)   Height: 170.2 cm (67\")       Vitals signs reviewed.   Constitutional:       Appearance: Normal appearance. Well-developed.   Pulmonary:      Effort: Pulmonary effort is normal.      Breath sounds: Normal breath sounds. No wheezing. No rhonchi. No rales.   Cardiovascular:      Normal rate. Regular rhythm.   Pulses:     Intact distal pulses.   Abdominal:      General: Bowel sounds are normal.      Palpations: Abdomen is soft.   Neurological:      Mental Status: Alert.           Procedures     Assessment/ Plan   Diagnoses and all orders for this visit:    1. Bilateral carotid artery occlusion (Primary):  Patient underwent repeat carotid duplex today - will ask Dr. Bearden to read and review and we will notify her of results.  Continue ASA, Plavix.  Will address use of statin as directed below.  Currently asymptomatic.  RTC 1 year or sooner PRN.    2. Essential hypertension:  Well controlled.  Continue current medical regimen and get routine " blood work with PCP.    3. Coronary artery disease involving native coronary artery of native heart without angina pectoris:  Stable without current symptoms. Refilled Plavix.  No change in medications today.  RTC 1 year with EKG or sooner PRN.    4.  Dyslipidemia:  Patient follows routinely with PCP.  She was intolerant of statin with BLE myalgias.  She was given Rx for Repatha by her PCP and tolerated well but was not able to get cost coverage and has been off all lipid medications since July of this year.  Will discuss with RN and see if we can get further approval and will notify patient.          Katiana Vasques PA-C functioning independently.  12/22/2020 10:28 EST

## 2020-12-29 ENCOUNTER — TELEPHONE (OUTPATIENT)
Dept: CARDIOLOGY | Facility: CLINIC | Age: 65
End: 2020-12-29

## 2020-12-31 ENCOUNTER — HOSPITAL ENCOUNTER (EMERGENCY)
Dept: HOSPITAL 79 - ER1 | Age: 65
Discharge: HOME | End: 2020-12-31
Payer: COMMERCIAL

## 2020-12-31 VITALS — BODY MASS INDEX: 25.9 KG/M2 | WEIGHT: 165 LBS | HEIGHT: 67 IN

## 2020-12-31 DIAGNOSIS — E11.9: ICD-10-CM

## 2020-12-31 DIAGNOSIS — U07.1: Primary | ICD-10-CM

## 2020-12-31 PROCEDURE — M0239 BAMLANIVIMAB-XXXX INFUSION: HCPCS

## 2021-02-10 ENCOUNTER — TRANSCRIBE ORDERS (OUTPATIENT)
Dept: ADMINISTRATIVE | Facility: HOSPITAL | Age: 66
End: 2021-02-10

## 2021-02-10 DIAGNOSIS — Z01.818 OTHER SPECIFIED PRE-OPERATIVE EXAMINATION: Primary | ICD-10-CM

## 2021-02-12 ENCOUNTER — LAB (OUTPATIENT)
Dept: LAB | Facility: HOSPITAL | Age: 66
End: 2021-02-12

## 2021-02-12 ENCOUNTER — APPOINTMENT (OUTPATIENT)
Dept: LAB | Facility: HOSPITAL | Age: 66
End: 2021-02-12

## 2021-02-12 DIAGNOSIS — Z01.818 OTHER SPECIFIED PRE-OPERATIVE EXAMINATION: ICD-10-CM

## 2021-02-15 RX ORDER — INSULIN GLARGINE 100 [IU]/ML
100 INJECTION, SOLUTION SUBCUTANEOUS NIGHTLY
Qty: 90 ML | Refills: 1 | Status: SHIPPED | OUTPATIENT
Start: 2021-02-15

## 2021-02-15 RX ORDER — INSULIN ASPART 100 [IU]/ML
15 INJECTION, SOLUTION INTRAVENOUS; SUBCUTANEOUS 3 TIMES DAILY
Qty: 40.5 ML | Refills: 0 | Status: SHIPPED | OUTPATIENT
Start: 2021-02-15 | End: 2021-05-16

## 2021-02-23 ENCOUNTER — TRANSCRIBE ORDERS (OUTPATIENT)
Dept: ADMINISTRATIVE | Facility: HOSPITAL | Age: 66
End: 2021-02-23

## 2021-02-23 DIAGNOSIS — Z01.818 OTHER SPECIFIED PRE-OPERATIVE EXAMINATION: Primary | ICD-10-CM

## 2021-02-26 ENCOUNTER — LAB (OUTPATIENT)
Dept: LAB | Facility: HOSPITAL | Age: 66
End: 2021-02-26

## 2021-02-26 DIAGNOSIS — Z01.818 OTHER SPECIFIED PRE-OPERATIVE EXAMINATION: ICD-10-CM

## 2021-02-26 PROCEDURE — U0005 INFEC AGEN DETEC AMPLI PROBE: HCPCS

## 2021-02-26 PROCEDURE — U0004 COV-19 TEST NON-CDC HGH THRU: HCPCS

## 2021-02-26 PROCEDURE — C9803 HOPD COVID-19 SPEC COLLECT: HCPCS

## 2021-02-27 LAB — SARS-COV-2 RNA RESP QL NAA+PROBE: NOT DETECTED

## 2021-03-09 ENCOUNTER — TRANSCRIBE ORDERS (OUTPATIENT)
Dept: ADMINISTRATIVE | Facility: HOSPITAL | Age: 66
End: 2021-03-09

## 2021-03-09 DIAGNOSIS — Z01.818 PRE-OPERATIVE CLEARANCE: Primary | ICD-10-CM

## 2021-03-12 ENCOUNTER — LAB (OUTPATIENT)
Dept: LAB | Facility: HOSPITAL | Age: 66
End: 2021-03-12

## 2021-03-12 DIAGNOSIS — Z01.818 PRE-OPERATIVE CLEARANCE: ICD-10-CM

## 2021-03-12 PROCEDURE — C9803 HOPD COVID-19 SPEC COLLECT: HCPCS

## 2021-03-12 PROCEDURE — U0004 COV-19 TEST NON-CDC HGH THRU: HCPCS

## 2021-03-12 PROCEDURE — U0005 INFEC AGEN DETEC AMPLI PROBE: HCPCS

## 2021-03-13 LAB — SARS-COV-2 RNA NOSE QL NAA+PROBE: NOT DETECTED

## 2021-08-04 ENCOUNTER — HOSPITAL ENCOUNTER (OUTPATIENT)
Dept: HOSPITAL 79 - KOH-I | Age: 66
End: 2021-08-04
Attending: PHYSICIAN ASSISTANT
Payer: COMMERCIAL

## 2021-08-04 DIAGNOSIS — R63.4: Primary | ICD-10-CM

## 2021-08-20 ENCOUNTER — HOSPITAL ENCOUNTER (OUTPATIENT)
Dept: HOSPITAL 79 - CT | Age: 66
End: 2021-08-20
Attending: PHYSICIAN ASSISTANT
Payer: COMMERCIAL

## 2021-08-20 ENCOUNTER — HOSPITAL ENCOUNTER (EMERGENCY)
Dept: HOSPITAL 79 - ER1 | Age: 66
Discharge: HOME | End: 2021-08-20
Payer: COMMERCIAL
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
## 2021-08-20 DIAGNOSIS — R09.89: ICD-10-CM

## 2021-08-20 DIAGNOSIS — Z79.82: ICD-10-CM

## 2021-08-20 DIAGNOSIS — S42.352A: Primary | ICD-10-CM

## 2021-08-20 DIAGNOSIS — R63.4: ICD-10-CM

## 2021-08-20 DIAGNOSIS — M79.606: ICD-10-CM

## 2021-08-20 DIAGNOSIS — S01.81XA: ICD-10-CM

## 2021-08-20 DIAGNOSIS — E11.9: ICD-10-CM

## 2021-08-20 DIAGNOSIS — R10.9: Primary | ICD-10-CM

## 2021-08-25 ENCOUNTER — HOSPITAL ENCOUNTER (OUTPATIENT)
Dept: HOSPITAL 79 - OR | Age: 66
Discharge: HOME | End: 2021-08-25
Attending: ORTHOPAEDIC SURGERY
Payer: COMMERCIAL

## 2021-08-25 VITALS — BODY MASS INDEX: 26.21 KG/M2 | WEIGHT: 167 LBS | HEIGHT: 67 IN

## 2021-08-25 DIAGNOSIS — Z79.891: ICD-10-CM

## 2021-08-25 DIAGNOSIS — I10: ICD-10-CM

## 2021-08-25 DIAGNOSIS — Z79.02: ICD-10-CM

## 2021-08-25 DIAGNOSIS — Z79.4: ICD-10-CM

## 2021-08-25 DIAGNOSIS — I25.10: ICD-10-CM

## 2021-08-25 DIAGNOSIS — S42.202A: Primary | ICD-10-CM

## 2021-08-25 DIAGNOSIS — Z79.82: ICD-10-CM

## 2021-08-25 DIAGNOSIS — Z79.899: ICD-10-CM

## 2021-08-25 DIAGNOSIS — M79.7: ICD-10-CM

## 2021-08-25 DIAGNOSIS — K21.9: ICD-10-CM

## 2021-08-25 DIAGNOSIS — Z95.5: ICD-10-CM

## 2021-08-25 DIAGNOSIS — E11.40: ICD-10-CM
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
## 2021-08-25 LAB
BUN/CREATININE RATIO: 18 (ref 0–10)
BUN/CREATININE RATIO: 21 (ref 0–10)
HGB BLD-MCNC: 10.3 GM/DL (ref 12.3–15.3)
HGB BLD-MCNC: 11.8 GM/DL (ref 12.3–15.3)
RED BLOOD COUNT: 3.25 M/UL (ref 4–5.1)
RED BLOOD COUNT: 3.75 M/UL (ref 4–5.1)
WHITE BLOOD COUNT: 7.2 K/UL (ref 4.5–11)
WHITE BLOOD COUNT: 9.2 K/UL (ref 4.5–11)

## 2021-08-25 PROCEDURE — C1776 JOINT DEVICE (IMPLANTABLE): HCPCS

## 2021-08-25 PROCEDURE — C1713 ANCHOR/SCREW BN/BN,TIS/BN: HCPCS

## 2022-05-18 ENCOUNTER — OFFICE VISIT (OUTPATIENT)
Dept: CARDIOLOGY | Facility: CLINIC | Age: 67
End: 2022-05-18

## 2022-05-18 VITALS
WEIGHT: 151.7 LBS | HEIGHT: 67 IN | BODY MASS INDEX: 23.81 KG/M2 | HEART RATE: 87 BPM | SYSTOLIC BLOOD PRESSURE: 120 MMHG | DIASTOLIC BLOOD PRESSURE: 62 MMHG | OXYGEN SATURATION: 97 %

## 2022-05-18 DIAGNOSIS — G62.9 PERIPHERAL POLYNEUROPATHY: ICD-10-CM

## 2022-05-18 DIAGNOSIS — I25.10 CORONARY ARTERY DISEASE INVOLVING NATIVE CORONARY ARTERY OF NATIVE HEART WITHOUT ANGINA PECTORIS: Primary | ICD-10-CM

## 2022-05-18 DIAGNOSIS — I65.23 BILATERAL CAROTID ARTERY OCCLUSION: ICD-10-CM

## 2022-05-18 DIAGNOSIS — E78.5 DYSLIPIDEMIA: ICD-10-CM

## 2022-05-18 DIAGNOSIS — I73.9 CLAUDICATION: ICD-10-CM

## 2022-05-18 DIAGNOSIS — I10 ESSENTIAL HYPERTENSION: ICD-10-CM

## 2022-05-18 PROCEDURE — 99214 OFFICE O/P EST MOD 30 MIN: CPT | Performed by: INTERNAL MEDICINE

## 2022-05-18 PROCEDURE — 93000 ELECTROCARDIOGRAM COMPLETE: CPT | Performed by: INTERNAL MEDICINE

## 2022-05-18 RX ORDER — EZETIMIBE 10 MG/1
TABLET ORAL
COMMUNITY
Start: 2022-03-21

## 2022-05-18 NOTE — PROGRESS NOTES
[facility]   [address], [address]  [place], [place], [address]    [phone]  [email or web address]           OUTPATIENT CLINIC FOLLOW UP NOTE    Patient Care Team:  Patient Care Team:  Savi Moreira MD as PCP - General    Subjective:      Chief Complaint   Patient presents with   • Bilateral carotid artery occlusion       HPI:    Pily Martinez is a 66 y.o. female.  Previously followed by Dr. Bearden for many years up until 2020    Cardiac focused, problem list:  1. Carotid artery disease.  a.  Asymptomatic.  b. Chronically occluded LICA.  c. Carotid duplex, July 2013:   of LICA, peak velocity ELLE = 134.  d. Carotid duplex, 9/2016:  LICA, ELLE 50-69%  e. Carotid duplex 12/2018:  of LICA, since 2016, velocities in ELLE are slightly increased with no change in ICA/CCA and velocity criteria consistent with <70% stenosis   2. Coronary artery disease.  a.  Probable angina pectoris.   b. Abnormal nuclear myocardial perfusion study, July 2014.    c. Normal left ventricular function.   d. Everolimus eluting stents, proximal LAD and mid-circumflex, 07/12/2014.    e. Nonobstructive by catheterization, 2009.  f. Normal nuclear myocardial perfusion study, July 2012.  g. [facility] admission for CP, echo 06/06/2014:  EF 60%, no VHD, no wall motion abnormalities.  3.  Asymptomatic bradycardia and rare atrial tachycardia.  a. Evaluated  by Tyson Cardenas MD --not PPM candidate.  4. Diabetes mellitus,  insulin requiring.  5. Essential Hypertension.  6. Dyslipidemia.  7. Family history of coronary artery disease.  8. Gastroesophageal reflux disease.  9. Fibromyalgia.  10. History of  ?pernicious anemia.  11. Status post operations, remote  12. Left shoulder repair due to traumatic fall    Since her last visit with cardiology the patient was hospitalized at [facility].  Fell while hospitalized, underwent left shoulder repair.    Has mild swelling, poor balance, discomfort  with walking, right greater than left leg.  Denies chest pain, strokelike symptoms.    Review of Systems:  As noted above in the HPI     PFSH:  Patient Active Problem List   Diagnosis   • Carotid artery disease (HCC)   • Coronary artery disease   • Twan-tachy syndrome (HCC)   • Insulin dependent diabetes mellitus   • Hypertension   • Dyslipidemia   • GERD (gastroesophageal reflux disease)   • Fibromyalgia         Current Outpatient Medications:   •  albuterol 108 (90 Base) MCG/ACT inhaler, Inhale 2 puffs As Needed., Disp: , Rfl:   •  aspirin 81 MG EC tablet, Take 81 mg by mouth Daily., Disp: , Rfl:   •  cholecalciferol (VITAMIN D3) 25 MCG (1000 UT) tablet, Take 1,000 Units by mouth Daily., Disp: , Rfl:   •  clopidogrel (PLAVIX) 75 MG tablet, Take 1 tablet by mouth Daily., Disp: 90 tablet, Rfl: 4  •  ezetimibe (ZETIA) 10 MG tablet, TAKE ONE TABLET BY MOUTH ONCE DAILY FOR CHOLESTEROL, Disp: , Rfl:   •  glyBURIDE-metFORMIN (GLUCOVANCE) 5-500 MG per tablet, Take 2 tablets by mouth 2 (two) times a day with meals., Disp: , Rfl:   •  Insulin Glargine (BASAGLAR KWIKPEN) 100 UNIT/ML injection pen, Inject 100 Units under the skin into the appropriate area as directed Every Night. (Patient taking differently: Inject 120 Units under the skin into the appropriate area as directed Every Night.), Disp: 90 mL, Rfl: 1  •  Loratadine 10 MG capsule, Take  by mouth Daily., Disp: , Rfl:   •  metoprolol tartrate (LOPRESSOR) 50 MG tablet, 50 mg Daily., Disp: , Rfl: 1  •  nitroglycerin (NITROSTAT) 0.4 MG SL tablet, Place 1 tablet under the tongue Every 5 (Five) Minutes As Needed for Chest Pain. Take no more than 3 doses in 15 minutes., Disp: 25 tablet, Rfl: 11  •  Evolocumab with Infusor (REPATHA) solution cartridge, Inject 3.5 mL under the skin into the appropriate area as directed Every 30 (Thirty) Days., Disp: 3.5 mL, Rfl: 11  •  insulin aspart (NovoLOG FlexPen) 100 UNIT/ML solution pen-injector sc pen, Inject 15 Units under the skin  "into the appropriate area as directed 3 (Three) Times a Day for 90 days. (Patient taking differently: Inject 25 Units under the skin into the appropriate area as directed 3 (Three) Times a Day.), Disp: 40.5 mL, Rfl: 0     reports that she has quit smoking. She has never used smokeless tobacco.      Objective:   Physical exam:  /62 (BP Location: Left arm, Patient Position: Sitting)   Pulse 87   Ht 170.2 cm (67\")   Wt 68.8 kg (151 lb 11.2 oz)   SpO2 97%   BMI 23.76 kg/m²   CONSTITUTIONAL: No acute distress  RESPIRATORY: Normal effort. Clear to auscultation bilaterally without wheezing or rales  CARDIOVASCULAR: Carotids with normal upstrokes without bruits.  Regular rate and rhythm with normal S1 and S2. Without murmur. Normal radial pulse. There is no significant lower extremity edema bilaterally.    5/18/2022 exam: Right pedal pulses nonpalpable, left DP and PT pulses 1+, hyperemic toes, spider veins    Labs:    Lab Results   Component Value Date     (H) 07/11/2014     No components found for: LDLDIRECTC    Diagnostic Data:      ECG 12 Lead    Date/Time: 5/18/2022 2:50 PM  Performed by: Vincent Villa MD  Authorized by: Vincent Villa MD   Comparison: compared with previous ECG from 7/11/2014  Similar to previous ECG  Rhythm: sinus rhythm  Comments: Nonspecific T wave abnormality                Assessment and Plan:   Coronary artery disease involving native coronary artery of native heart without angina pectoris  Dyslipidemia  -Without anginal symptoms  -Continue aspirin, clopidogrel, beta-blocker, Zetia  -Obtaining last set of labs from PCP office including a lipid panel  -Statin intolerant  -Repatha previously too expensive.  We will reassess cost and consider once monthly dosing again    Bilateral carotid artery occlusion  -DAPT, cholesterol lowering therapies as noted above  -Repeat carotid duplex ultrasound    Essential hypertension  -Continue current medication    Possible claudication "   Peripheral polyneuropathy   Joint pain, possible arthritis  -Decreased pedal pulses in the right leg, right greater than left claudication-like symptoms  -Medical therapy as noted above for atherosclerosis  -If she did not undergo a lower extremity arterial duplex during her recent OSH hospitalization, will have her undergo 1 at the same time as her repeat carotid duplex    -*Addendum: Obtained OSH arterial duplex ultrasound from 8/2021 which revealed normal ABIs 1.0, bilaterally.  Retrograde DP flow in the RLE, but otherwise triphasic waveforms throughout.    -Will defer on further PAD work-up    - Return in about 1 year (around 5/18/2023) for Next scheduled follow-up with an ECG.    Vincent Villa MD, MSc, FACC, Psychiatric  Interventional Cardiology  [facility]

## 2022-05-31 ENCOUNTER — HOSPITAL ENCOUNTER (OUTPATIENT)
Dept: CARDIOLOGY | Facility: HOSPITAL | Age: 67
Discharge: HOME | End: 2022-05-31
Admitting: INTERNAL MEDICINE

## 2022-05-31 VITALS — HEIGHT: 67 IN | BODY MASS INDEX: 23.81 KG/M2 | WEIGHT: 151.68 LBS

## 2022-05-31 DIAGNOSIS — I65.23 BILATERAL CAROTID ARTERY OCCLUSION: ICD-10-CM

## 2022-05-31 LAB
BH CV XLRA MEAS LEFT DIST CCA EDV: 10.2 CM/SEC
BH CV XLRA MEAS LEFT DIST CCA PSV: 33.4 CM/SEC
BH CV XLRA MEAS LEFT DIST ICA EDV: 0 CM/SEC
BH CV XLRA MEAS LEFT DIST ICA PSV: 0 CM/SEC
BH CV XLRA MEAS LEFT MID CCA EDV: 9.8 CM/SEC
BH CV XLRA MEAS LEFT MID CCA PSV: 50.3 CM/SEC
BH CV XLRA MEAS LEFT MID ICA EDV: 0 CM/SEC
BH CV XLRA MEAS LEFT MID ICA PSV: 0 CM/SEC
BH CV XLRA MEAS LEFT PROX CCA EDV: 0 CM/SEC
BH CV XLRA MEAS LEFT PROX CCA PSV: 59.8 CM/SEC
BH CV XLRA MEAS LEFT PROX ECA EDV: 108 CM/SEC
BH CV XLRA MEAS LEFT PROX ECA PSV: 451 CM/SEC
BH CV XLRA MEAS LEFT PROX ICA EDV: 0 CM/SEC
BH CV XLRA MEAS LEFT PROX ICA PSV: 0 CM/SEC
BH CV XLRA MEAS LEFT PROX SCLA PSV: 225 CM/SEC
BH CV XLRA MEAS LEFT VERTEBRAL A EDV: 33 CM/SEC
BH CV XLRA MEAS LEFT VERTEBRAL A PSV: 102 CM/SEC
BH CV XLRA MEAS RIGHT DIST CCA EDV: 24 CM/SEC
BH CV XLRA MEAS RIGHT DIST CCA PSV: 68.6 CM/SEC
BH CV XLRA MEAS RIGHT DIST ICA EDV: 50.1 CM/SEC
BH CV XLRA MEAS RIGHT DIST ICA PSV: 134 CM/SEC
BH CV XLRA MEAS RIGHT ICA/CCA RATIO: 1.87
BH CV XLRA MEAS RIGHT MID CCA EDV: 31.7 CM/SEC
BH CV XLRA MEAS RIGHT MID CCA PSV: 86.8 CM/SEC
BH CV XLRA MEAS RIGHT MID ICA EDV: 62.9 CM/SEC
BH CV XLRA MEAS RIGHT MID ICA PSV: 153 CM/SEC
BH CV XLRA MEAS RIGHT PROX CCA EDV: 24 CM/SEC
BH CV XLRA MEAS RIGHT PROX CCA PSV: 107 CM/SEC
BH CV XLRA MEAS RIGHT PROX ECA EDV: 55 CM/SEC
BH CV XLRA MEAS RIGHT PROX ECA PSV: 409 CM/SEC
BH CV XLRA MEAS RIGHT PROX ICA EDV: 54 CM/SEC
BH CV XLRA MEAS RIGHT PROX ICA PSV: 162 CM/SEC
BH CV XLRA MEAS RIGHT PROX SCLA PSV: 185 CM/SEC
BH CV XLRA MEAS RIGHT VERTEBRAL A EDV: 28.7 CM/SEC
BH CV XLRA MEAS RIGHT VERTEBRAL A PSV: 70.9 CM/SEC
LEFT ARM BP: NORMAL MMHG
MAXIMAL PREDICTED HEART RATE: 154 BPM
RIGHT ARM BP: NORMAL MMHG
STRESS TARGET HR: 131 BPM

## 2022-05-31 PROCEDURE — 93880 EXTRACRANIAL BILAT STUDY: CPT | Performed by: INTERNAL MEDICINE

## 2022-05-31 PROCEDURE — 93880 EXTRACRANIAL BILAT STUDY: CPT

## 2022-06-01 ENCOUNTER — TELEPHONE (OUTPATIENT)
Dept: CARDIOLOGY | Facility: CLINIC | Age: 67
End: 2022-06-01

## 2022-06-01 NOTE — TELEPHONE ENCOUNTER
----- Message from Vincent Villa MD sent at 5/31/2022  3:19 PM EDT -----  Please let the patient know her carotid duplex ultrasound is unchanged when compared to the 12/2020 study.  ----- Message -----  From: Vincent Villa MD  Sent: 5/31/2022   3:19 PM EDT  To: Vincent Villa MD

## 2022-07-25 ENCOUNTER — HOSPITAL ENCOUNTER (OUTPATIENT)
Dept: HOSPITAL 79 - CT | Age: 67
End: 2022-07-25
Attending: INTERNAL MEDICINE
Payer: MEDICARE

## 2022-07-25 DIAGNOSIS — I70.202: ICD-10-CM

## 2022-07-25 DIAGNOSIS — M79.606: Primary | ICD-10-CM

## 2022-07-25 DIAGNOSIS — R09.89: ICD-10-CM

## 2022-07-25 DIAGNOSIS — I70.8: ICD-10-CM

## 2022-11-15 ENCOUNTER — ANESTHESIA EVENT (OUTPATIENT)
Dept: PERIOP | Facility: HOSPITAL | Age: 67
End: 2022-11-15

## 2022-11-15 RX ORDER — SODIUM CHLORIDE 0.9 % (FLUSH) 0.9 %
10 SYRINGE (ML) INJECTION EVERY 12 HOURS SCHEDULED
Status: CANCELLED | OUTPATIENT
Start: 2022-11-15

## 2022-11-15 RX ORDER — FAMOTIDINE 10 MG/ML
20 INJECTION, SOLUTION INTRAVENOUS ONCE
Status: CANCELLED | OUTPATIENT
Start: 2022-11-15 | End: 2022-11-15

## 2022-11-15 RX ORDER — SODIUM CHLORIDE 0.9 % (FLUSH) 0.9 %
10 SYRINGE (ML) INJECTION AS NEEDED
Status: CANCELLED | OUTPATIENT
Start: 2022-11-15

## 2022-11-16 ENCOUNTER — HOSPITAL ENCOUNTER (OUTPATIENT)
Facility: HOSPITAL | Age: 67
Setting detail: HOSPITAL OUTPATIENT SURGERY
Discharge: HOME | End: 2022-11-16
Attending: SURGERY | Admitting: SURGERY

## 2022-11-16 ENCOUNTER — APPOINTMENT (OUTPATIENT)
Dept: GENERAL RADIOLOGY | Facility: HOSPITAL | Age: 67
End: 2022-11-16

## 2022-11-16 ENCOUNTER — ANESTHESIA (OUTPATIENT)
Dept: PERIOP | Facility: HOSPITAL | Age: 67
End: 2022-11-16

## 2022-11-16 VITALS
TEMPERATURE: 98.5 F | SYSTOLIC BLOOD PRESSURE: 165 MMHG | OXYGEN SATURATION: 99 % | DIASTOLIC BLOOD PRESSURE: 84 MMHG | WEIGHT: 151 LBS | BODY MASS INDEX: 23.7 KG/M2 | RESPIRATION RATE: 16 BRPM | HEIGHT: 67 IN | HEART RATE: 76 BPM

## 2022-11-16 LAB
ANION GAP SERPL CALCULATED.3IONS-SCNC: 11 MMOL/L (ref 5–15)
BASOPHILS # BLD AUTO: 0.01 10*3/MM3 (ref 0–0.2)
BASOPHILS NFR BLD AUTO: 0.2 % (ref 0–1.5)
BUN SERPL-MCNC: 16 MG/DL (ref 8–23)
BUN/CREAT SERPL: 21.1 (ref 7–25)
CALCIUM SPEC-SCNC: 9 MG/DL (ref 8.6–10.5)
CHLORIDE SERPL-SCNC: 100 MMOL/L (ref 98–107)
CO2 SERPL-SCNC: 25 MMOL/L (ref 22–29)
CREAT SERPL-MCNC: 0.76 MG/DL (ref 0.57–1)
DEPRECATED RDW RBC AUTO: 38.5 FL (ref 37–54)
EGFRCR SERPLBLD CKD-EPI 2021: 86 ML/MIN/1.73
EOSINOPHIL # BLD AUTO: 0.12 10*3/MM3 (ref 0–0.4)
EOSINOPHIL NFR BLD AUTO: 2 % (ref 0.3–6.2)
ERYTHROCYTE [DISTWIDTH] IN BLOOD BY AUTOMATED COUNT: 12.1 % (ref 12.3–15.4)
GLUCOSE BLDC GLUCOMTR-MCNC: 289 MG/DL (ref 70–130)
GLUCOSE BLDC GLUCOMTR-MCNC: 305 MG/DL (ref 70–130)
GLUCOSE SERPL-MCNC: 347 MG/DL (ref 65–99)
HBA1C MFR BLD: 11.9 % (ref 4.8–5.6)
HCT VFR BLD AUTO: 41.2 % (ref 34–46.6)
HGB BLD-MCNC: 14.4 G/DL (ref 12–15.9)
IMM GRANULOCYTES # BLD AUTO: 0.04 10*3/MM3 (ref 0–0.05)
IMM GRANULOCYTES NFR BLD AUTO: 0.7 % (ref 0–0.5)
LYMPHOCYTES # BLD AUTO: 2.09 10*3/MM3 (ref 0.7–3.1)
LYMPHOCYTES NFR BLD AUTO: 34.1 % (ref 19.6–45.3)
MCH RBC QN AUTO: 30.6 PG (ref 26.6–33)
MCHC RBC AUTO-ENTMCNC: 35 G/DL (ref 31.5–35.7)
MCV RBC AUTO: 87.5 FL (ref 79–97)
MONOCYTES # BLD AUTO: 0.44 10*3/MM3 (ref 0.1–0.9)
MONOCYTES NFR BLD AUTO: 7.2 % (ref 5–12)
NEUTROPHILS NFR BLD AUTO: 3.43 10*3/MM3 (ref 1.7–7)
NEUTROPHILS NFR BLD AUTO: 55.8 % (ref 42.7–76)
NRBC BLD AUTO-RTO: 0 /100 WBC (ref 0–0.2)
PLATELET # BLD AUTO: 227 10*3/MM3 (ref 140–450)
PMV BLD AUTO: 10.4 FL (ref 6–12)
POTASSIUM SERPL-SCNC: 4.2 MMOL/L (ref 3.5–5.2)
RBC # BLD AUTO: 4.71 10*6/MM3 (ref 3.77–5.28)
SODIUM SERPL-SCNC: 136 MMOL/L (ref 136–145)
WBC NRBC COR # BLD: 6.13 10*3/MM3 (ref 3.4–10.8)

## 2022-11-16 PROCEDURE — C1894 INTRO/SHEATH, NON-LASER: HCPCS | Performed by: SURGERY

## 2022-11-16 PROCEDURE — 85025 COMPLETE CBC W/AUTO DIFF WBC: CPT | Performed by: SURGERY

## 2022-11-16 PROCEDURE — 25010000002 HEPARIN (PORCINE) PER 1000 UNITS: Performed by: NURSE ANESTHETIST, CERTIFIED REGISTERED

## 2022-11-16 PROCEDURE — 25010000002 HEPARIN (PORCINE) PER 1000 UNITS: Performed by: SURGERY

## 2022-11-16 PROCEDURE — C1887 CATHETER, GUIDING: HCPCS | Performed by: SURGERY

## 2022-11-16 PROCEDURE — 25010000002 CEFAZOLIN IN DEXTROSE 2-4 GM/100ML-% SOLUTION: Performed by: SURGERY

## 2022-11-16 PROCEDURE — C1769 GUIDE WIRE: HCPCS | Performed by: SURGERY

## 2022-11-16 PROCEDURE — 75710 ARTERY X-RAYS ARM/LEG: CPT

## 2022-11-16 PROCEDURE — 63710000001 INSULIN LISPRO (HUMAN) PER 5 UNITS: Performed by: ANESTHESIOLOGY

## 2022-11-16 PROCEDURE — 80048 BASIC METABOLIC PNL TOTAL CA: CPT | Performed by: SURGERY

## 2022-11-16 PROCEDURE — 0 LIDOCAINE 1 % SOLUTION: Performed by: SURGERY

## 2022-11-16 PROCEDURE — C1876 STENT, NON-COA/NON-COV W/DEL: HCPCS | Performed by: SURGERY

## 2022-11-16 PROCEDURE — 83036 HEMOGLOBIN GLYCOSYLATED A1C: CPT | Performed by: SURGERY

## 2022-11-16 PROCEDURE — 82962 GLUCOSE BLOOD TEST: CPT

## 2022-11-16 PROCEDURE — 25010000002 PROPOFOL 10 MG/ML EMULSION: Performed by: NURSE ANESTHETIST, CERTIFIED REGISTERED

## 2022-11-16 PROCEDURE — 75726 ARTERY X-RAYS ABDOMEN: CPT

## 2022-11-16 PROCEDURE — C1725 CATH, TRANSLUMIN NON-LASER: HCPCS | Performed by: SURGERY

## 2022-11-16 PROCEDURE — C1753 CATH, INTRAVAS ULTRASOUND: HCPCS | Performed by: SURGERY

## 2022-11-16 DEVICE — PREMOUNTED STENT SYSTEM
Type: IMPLANTABLE DEVICE | Site: LEG | Status: FUNCTIONAL
Brand: EXPRESS® SD RENAL/BILIARY

## 2022-11-16 RX ORDER — MEPERIDINE HYDROCHLORIDE 25 MG/ML
12.5 INJECTION INTRAMUSCULAR; INTRAVENOUS; SUBCUTANEOUS
Status: DISCONTINUED | OUTPATIENT
Start: 2022-11-16 | End: 2022-11-16 | Stop reason: HOSPADM

## 2022-11-16 RX ORDER — SODIUM CHLORIDE 0.9 % (FLUSH) 0.9 %
3 SYRINGE (ML) INJECTION EVERY 12 HOURS SCHEDULED
Status: DISCONTINUED | OUTPATIENT
Start: 2022-11-16 | End: 2022-11-16 | Stop reason: HOSPADM

## 2022-11-16 RX ORDER — NALOXONE HCL 0.4 MG/ML
0.4 VIAL (ML) INJECTION AS NEEDED
Status: DISCONTINUED | OUTPATIENT
Start: 2022-11-16 | End: 2022-11-16 | Stop reason: HOSPADM

## 2022-11-16 RX ORDER — DROPERIDOL 2.5 MG/ML
0.62 INJECTION, SOLUTION INTRAMUSCULAR; INTRAVENOUS
Status: DISCONTINUED | OUTPATIENT
Start: 2022-11-16 | End: 2022-11-16 | Stop reason: HOSPADM

## 2022-11-16 RX ORDER — PROMETHAZINE HYDROCHLORIDE 25 MG/1
25 TABLET ORAL ONCE AS NEEDED
Status: DISCONTINUED | OUTPATIENT
Start: 2022-11-16 | End: 2022-11-16 | Stop reason: HOSPADM

## 2022-11-16 RX ORDER — HYDROMORPHONE HYDROCHLORIDE 1 MG/ML
0.5 INJECTION, SOLUTION INTRAMUSCULAR; INTRAVENOUS; SUBCUTANEOUS
Status: DISCONTINUED | OUTPATIENT
Start: 2022-11-16 | End: 2022-11-16 | Stop reason: HOSPADM

## 2022-11-16 RX ORDER — LIDOCAINE HYDROCHLORIDE 10 MG/ML
0.5 INJECTION, SOLUTION EPIDURAL; INFILTRATION; INTRACAUDAL; PERINEURAL ONCE AS NEEDED
Status: COMPLETED | OUTPATIENT
Start: 2022-11-16 | End: 2022-11-16

## 2022-11-16 RX ORDER — HYDROCODONE BITARTRATE AND ACETAMINOPHEN 5; 325 MG/1; MG/1
1 TABLET ORAL ONCE AS NEEDED
Status: DISCONTINUED | OUTPATIENT
Start: 2022-11-16 | End: 2022-11-16 | Stop reason: HOSPADM

## 2022-11-16 RX ORDER — ACETAMINOPHEN 325 MG/1
650 TABLET ORAL EVERY 4 HOURS PRN
Status: CANCELLED | OUTPATIENT
Start: 2022-11-16

## 2022-11-16 RX ORDER — LIDOCAINE HYDROCHLORIDE 10 MG/ML
INJECTION, SOLUTION INFILTRATION; PERINEURAL AS NEEDED
Status: DISCONTINUED | OUTPATIENT
Start: 2022-11-16 | End: 2022-11-16 | Stop reason: HOSPADM

## 2022-11-16 RX ORDER — CEFAZOLIN SODIUM 2 G/100ML
2 INJECTION, SOLUTION INTRAVENOUS ONCE
Status: COMPLETED | OUTPATIENT
Start: 2022-11-16 | End: 2022-11-16

## 2022-11-16 RX ORDER — HYDRALAZINE HYDROCHLORIDE 20 MG/ML
5 INJECTION INTRAMUSCULAR; INTRAVENOUS
Status: DISCONTINUED | OUTPATIENT
Start: 2022-11-16 | End: 2022-11-16 | Stop reason: HOSPADM

## 2022-11-16 RX ORDER — BUPIVACAINE HCL/0.9 % NACL/PF 0.125 %
PLASTIC BAG, INJECTION (ML) EPIDURAL AS NEEDED
Status: DISCONTINUED | OUTPATIENT
Start: 2022-11-16 | End: 2022-11-16 | Stop reason: SURG

## 2022-11-16 RX ORDER — SODIUM CHLORIDE 0.9 % (FLUSH) 0.9 %
3-10 SYRINGE (ML) INJECTION AS NEEDED
Status: DISCONTINUED | OUTPATIENT
Start: 2022-11-16 | End: 2022-11-16 | Stop reason: HOSPADM

## 2022-11-16 RX ORDER — PROMETHAZINE HYDROCHLORIDE 25 MG/1
25 SUPPOSITORY RECTAL ONCE AS NEEDED
Status: DISCONTINUED | OUTPATIENT
Start: 2022-11-16 | End: 2022-11-16 | Stop reason: HOSPADM

## 2022-11-16 RX ORDER — SODIUM CHLORIDE, SODIUM LACTATE, POTASSIUM CHLORIDE, CALCIUM CHLORIDE 600; 310; 30; 20 MG/100ML; MG/100ML; MG/100ML; MG/100ML
9 INJECTION, SOLUTION INTRAVENOUS CONTINUOUS
Status: DISCONTINUED | OUTPATIENT
Start: 2022-11-16 | End: 2022-11-16 | Stop reason: HOSPADM

## 2022-11-16 RX ORDER — IPRATROPIUM BROMIDE AND ALBUTEROL SULFATE 2.5; .5 MG/3ML; MG/3ML
3 SOLUTION RESPIRATORY (INHALATION) ONCE AS NEEDED
Status: DISCONTINUED | OUTPATIENT
Start: 2022-11-16 | End: 2022-11-16 | Stop reason: HOSPADM

## 2022-11-16 RX ORDER — ONDANSETRON 2 MG/ML
4 INJECTION INTRAMUSCULAR; INTRAVENOUS ONCE AS NEEDED
Status: DISCONTINUED | OUTPATIENT
Start: 2022-11-16 | End: 2022-11-16 | Stop reason: HOSPADM

## 2022-11-16 RX ORDER — DROPERIDOL 2.5 MG/ML
0.62 INJECTION, SOLUTION INTRAMUSCULAR; INTRAVENOUS ONCE AS NEEDED
Status: DISCONTINUED | OUTPATIENT
Start: 2022-11-16 | End: 2022-11-16 | Stop reason: HOSPADM

## 2022-11-16 RX ORDER — MIDAZOLAM HYDROCHLORIDE 1 MG/ML
0.5 INJECTION INTRAMUSCULAR; INTRAVENOUS
Status: DISCONTINUED | OUTPATIENT
Start: 2022-11-16 | End: 2022-11-16 | Stop reason: HOSPADM

## 2022-11-16 RX ORDER — SODIUM CHLORIDE 0.9 % (FLUSH) 0.9 %
10 SYRINGE (ML) INJECTION AS NEEDED
Status: CANCELLED | OUTPATIENT
Start: 2022-11-16

## 2022-11-16 RX ORDER — FENTANYL CITRATE 50 UG/ML
50 INJECTION, SOLUTION INTRAMUSCULAR; INTRAVENOUS
Status: DISCONTINUED | OUTPATIENT
Start: 2022-11-16 | End: 2022-11-16 | Stop reason: HOSPADM

## 2022-11-16 RX ORDER — SODIUM CHLORIDE 0.9 % (FLUSH) 0.9 %
10 SYRINGE (ML) INJECTION EVERY 12 HOURS SCHEDULED
Status: CANCELLED | OUTPATIENT
Start: 2022-11-16

## 2022-11-16 RX ORDER — FAMOTIDINE 20 MG/1
20 TABLET, FILM COATED ORAL ONCE
Status: COMPLETED | OUTPATIENT
Start: 2022-11-16 | End: 2022-11-16

## 2022-11-16 RX ORDER — PROPOFOL 10 MG/ML
VIAL (ML) INTRAVENOUS AS NEEDED
Status: DISCONTINUED | OUTPATIENT
Start: 2022-11-16 | End: 2022-11-16 | Stop reason: SURG

## 2022-11-16 RX ORDER — LABETALOL HYDROCHLORIDE 5 MG/ML
5 INJECTION, SOLUTION INTRAVENOUS
Status: DISCONTINUED | OUTPATIENT
Start: 2022-11-16 | End: 2022-11-16 | Stop reason: HOSPADM

## 2022-11-16 RX ORDER — INSULIN LISPRO 100 [IU]/ML
0-7 INJECTION, SOLUTION INTRAVENOUS; SUBCUTANEOUS
Status: DISCONTINUED | OUTPATIENT
Start: 2022-11-16 | End: 2022-11-16 | Stop reason: HOSPADM

## 2022-11-16 RX ORDER — HEPARIN SODIUM 1000 [USP'U]/ML
INJECTION, SOLUTION INTRAVENOUS; SUBCUTANEOUS AS NEEDED
Status: DISCONTINUED | OUTPATIENT
Start: 2022-11-16 | End: 2022-11-16 | Stop reason: SURG

## 2022-11-16 RX ADMIN — PROPOFOL 75 MCG/KG/MIN: 10 INJECTION, EMULSION INTRAVENOUS at 10:30

## 2022-11-16 RX ADMIN — LIDOCAINE HYDROCHLORIDE 0.5 ML: 10 INJECTION, SOLUTION EPIDURAL; INFILTRATION; INTRACAUDAL; PERINEURAL at 09:34

## 2022-11-16 RX ADMIN — FAMOTIDINE 20 MG: 20 TABLET, FILM COATED ORAL at 09:34

## 2022-11-16 RX ADMIN — Medication 100 MCG: at 10:53

## 2022-11-16 RX ADMIN — INSULIN LISPRO 5 UNITS: 100 INJECTION, SOLUTION INTRAVENOUS; SUBCUTANEOUS at 09:54

## 2022-11-16 RX ADMIN — SODIUM CHLORIDE, POTASSIUM CHLORIDE, SODIUM LACTATE AND CALCIUM CHLORIDE 9 ML/HR: 600; 310; 30; 20 INJECTION, SOLUTION INTRAVENOUS at 09:34

## 2022-11-16 RX ADMIN — HEPARIN SODIUM 7000 UNITS: 1000 INJECTION, SOLUTION INTRAVENOUS; SUBCUTANEOUS at 10:40

## 2022-11-16 RX ADMIN — PROPOFOL 50 MG: 10 INJECTION, EMULSION INTRAVENOUS at 10:32

## 2022-11-16 RX ADMIN — CEFAZOLIN SODIUM 2 G: 2 INJECTION, SOLUTION INTRAVENOUS at 10:33

## 2022-11-16 NOTE — ANESTHESIA PREPROCEDURE EVALUATION
Anesthesia Evaluation     Patient summary reviewed and Nursing notes reviewed                Airway   Mallampati: II  TM distance: >3 FB  Neck ROM: full  No difficulty expected  Dental - normal exam     Pulmonary - negative pulmonary ROS and normal exam   Cardiovascular - normal exam    (+) hypertension, CAD, cardiac stents (LAD, circ 2014; asa/plavix tx (yesterday))  carotid artery disease (chronically occluded LICA, ELLE 50-70%)      Neuro/Psych- negative ROS  GI/Hepatic/Renal/Endo    (+)  GERD,  diabetes mellitus,     Musculoskeletal (-) negative ROS    Abdominal  - normal exam    Bowel sounds: normal.   Substance History - negative use     OB/GYN negative ob/gyn ROS         Other                      Anesthesia Plan    ASA 3     MAC     intravenous induction     Anesthetic plan, risks, benefits, and alternatives have been provided, discussed and informed consent has been obtained with: patient.    Plan discussed with CRNA.        CODE STATUS:

## 2022-11-16 NOTE — OP NOTE
AORTAGRAM WITH OR WITHOUT RUNOFFS POSSIBLE STENT  Procedure Report    Patient Name:  Pily Martinez  YOB: 1955    Date of Surgery:  11/16/2022     Indications: This is a 67-year-old female with chronic mesenteric ischemia and weight loss.  She is also experiencing bilateral lower extremity claudication.  She has been offered mesenteric angiography with potential stenting as well as right lower extremity angiography.    Pre-op Diagnosis:   Chronic mesenteric angina with lower extremity claudication    Post-Op Diagnosis Codes:  Chronic mesenteric angina with lower extremity claudication    Procedure/CPT® Codes:      Procedure(s):  ARTERIOGRAM OF SUPERIOR MESENTERIC ARTERY,   SUPERIOR MESENTERIC ARTERY STENT PLACEMENT,   SUPERIOR MESENTERIC INTRAVASCULAR ULTRASOUND,   RIGHT POPLITEAL AND SUPERFICIAL FEMORAL ARTERY ANGIOPLASTY    Staff:  Surgeon(s):  Nickolas Florentino MD    Circulator: Shelbie Iverson RN; Leonor Mclaughlin RN; Tanna Kelsey RN  Scrub Person: David Baum  Nursing Assistant: Nathaly Reagan PCT             Anesthesia: Monitored Anesthesia Care    Estimated Blood Loss: minimal    Implants:    Implant Name Type Inv. Item Serial No.  Lot No. LRB No. Used Action   STNT [employer] SD 6F [id number] 150CM - [id number] Stent STNT MON[employer] SD 6F [id number] 150CM  [employer] [id number] N/A 1 Implanted       Specimen:          None        Findings:    1.  Selective mesenteric angiogram demonstrated patency of the celiac and superior mesenteric artery    2.  Intravascular ultrasound demonstrating severe stenosis of the ostium of the superior mesenteric artery greater than 70%.  This was stented with a 6 x 14 balloon mounted renal SD stent    3.  Right lower extremity angiography demonstrating severe stenosis of the popliteal artery at the P2 segment as well as severe ostial superficial femoral artery stenosis.  Both are treated with 5 mm chocolate balloon angioplasty.   No residual stenosis persisted.  Runoff of the right lower extremity was through right posterior tibial and peroneal artery    Complications: None    Description of Procedure:    1.  Patient was taken back to the operating placed in supine position on the operative table.  Following IV sedation bilateral groins were widely prepped and draped in standard sterile fashion.  Timeout was taken with identification of the patient and procedure.  Under ultrasound guidance left common femoral artery was accessed successfully.  6 Wolof sheath was placed and a flush catheter was advanced.  Heparin was administered.  Abdominal aortography was performed demonstrating patency of the infrarenal aorta.  Moderate bilateral renal artery stenosis was present.  A lateral view aortography was performed as the superior mesenteric artery was initially not seen.  During this view both SMA and celiac appeared to be widely patent.  This was a discrepancy compared to previous ultrasound.  The superior mesenteric artery was next elected and a 6 Wolof sheath was advanced.  Intravascular ultrasound was performed.  This demonstrated severe stenosis of the ostium of the superior mesenteric artery.  This was over 70% on intravascular interpretation.  This was stented with a 6 x 14 balloon mounted renal SD stent.  Completion angiography demonstrated no residual stenosis.    The aortic bifurcation was next crossed.  Right lower extremity angiography was performed.  There was severe stenosis of the origin of the superficial femoral artery.  The popliteal artery at the P2 junction also had critical stenosis of over 90%.  An 014 wire was used to traverse both lesions.  A 5 chocolate sculpting balloon angioplasty of both popliteal and proximal superficial femoral artery was performed.  No residual stenosis persisted.  Left femoral sheath was removed and Angio-Seal closure device was used successfully.      Nickolas Florentino MD     Date: 11/16/2022  Time:  11:21 EST

## 2022-11-16 NOTE — H&P
Patient Care Team:  Savi Moreira MD as PCP - General    Chief complaint abdominal pain with weight loss and complaints of right leg claudication    Subjective     Patient is a 67 y.o. female presents with significant amount of weight loss.  She has severe abdominal pain which occurs 3-4 times a week.  She has had a arterial duplex demonstrating greater than 70% superior mesenteric artery stenosis.  She is also undergone right leg revascularization but unfortunately she has had no improvements in symptoms.  She still experiences right leg claudication and JD has been unchanged.  Review of Systems   Pertinent items are noted in HPI, all other systems reviewed and negative    History  Past Medical History:   Diagnosis Date   • Twan-tachy syndrome (HCC)    • CAD (coronary artery disease)    • Carotid artery disease (HCC)    • Dyslipidemia    • Fibromyalgia    • GERD (gastroesophageal reflux disease)    • Hypertension    • Insulin dependent diabetes mellitus    • Neuropathy    • Pernicious anemia      Past Surgical History:   Procedure Laterality Date   • CARPAL TUNNEL RELEASE      left hand   • CATARACT EXTRACTION      both eyes   • EYE SURGERY Left     x2   • HYSTERECTOMY     • TOTAL SHOULDER REPLACEMENT Left      Family History   Problem Relation Age of Onset   • Hypertension Mother    • Heart disease Mother    • Hypertension Father    • Heart disease Father    • Breast cancer Neg Hx      Social History     Tobacco Use   • Smoking status: Former   • Smokeless tobacco: Never   • Tobacco comments:     quit over 40 years ago   Vaping Use   • Vaping Use: Never used   Substance Use Topics   • Alcohol use: No   • Drug use: No     E-cigarette/Vaping   • E-cigarette/Vaping Use Never User      E-cigarette/Vaping Substances     Medications Prior to Admission   Medication Sig Dispense Refill Last Dose   • aspirin 81 MG EC tablet Take 81 mg by mouth Daily.   11/15/2022 at 2300   • cholecalciferol (VITAMIN D3) 25 MCG  (1000 UT) tablet Take 1,000 Units by mouth Daily.   Past Week   • clopidogrel (PLAVIX) 75 MG tablet Take 1 tablet by mouth Daily. 90 tablet 4 11/15/2022 at 2300   • Evolocumab with Infusor (REPATHA) solution cartridge Inject 3.5 mL under the skin into the appropriate area as directed Every 30 (Thirty) Days. 3.5 mL 11 11/15/2022   • ezetimibe (ZETIA) 10 MG tablet TAKE ONE TABLET BY MOUTH ONCE DAILY FOR CHOLESTEROL   11/15/2022 at 2300   • Loratadine 10 MG capsule Take  by mouth Daily.   Past Month   • metoprolol tartrate (LOPRESSOR) 50 MG tablet 50 mg Daily.  1 11/15/2022 at 2300   • albuterol 108 (90 Base) MCG/ACT inhaler Inhale 2 puffs As Needed.   More than a month   • glyBURIDE-metFORMIN (GLUCOVANCE) 5-500 MG per tablet Take 2 tablets by mouth 2 (two) times a day with meals.   11/14/2022   • insulin aspart (NovoLOG FlexPen) 100 UNIT/ML solution pen-injector sc pen Inject 15 Units under the skin into the appropriate area as directed 3 (Three) Times a Day for 90 days. (Patient taking differently: Inject 25 Units under the skin into the appropriate area as directed 3 (Three) Times a Day.) 40.5 mL 0    • Insulin Glargine (BASAGLAR KWIKPEN) 100 UNIT/ML injection pen Inject 100 Units under the skin into the appropriate area as directed Every Night. (Patient taking differently: Inject 120 Units under the skin into the appropriate area as directed Every Night.) 90 mL 1    • nitroglycerin (NITROSTAT) 0.4 MG SL tablet Place 1 tablet under the tongue Every 5 (Five) Minutes As Needed for Chest Pain. Take no more than 3 doses in 15 minutes. 25 tablet 11        Objective     Vital Signs  Temp:  [97.9 °F (36.6 °C)] 97.9 °F (36.6 °C)  Heart Rate:  [78] 78  Resp:  [18] 18  BP: (141)/(74) 141/74    Physical Exam:      General Appearance:    Alert, cooperative, in no acute distress   Head:    Normocephalic, without obvious abnormality, atraumatic   Eyes:            Lids and lashes normal, conjunctivae and sclerae normal, no    icterus, no pallor, corneas clear, PERRLA   Ears:    Ears appear intact with no abnormalities noted   Throat:   No oral lesions, no thrush, oral mucosa moist   Neck:   No adenopathy, supple, trachea midline, no thyromegaly, no     carotid bruit, no JVD   Back:     No kyphosis present, no scoliosis present, no skin lesions,       erythema or scars, no tenderness to percussion or                   palpation,   range of motion normal   Lungs:     Clear to auscultation,respirations regular, even and                   unlabored    Heart:    Regular rhythm and normal rate, normal S1 and S2, no            murmur, no gallop, no rub, no click   Breast Exam:    Deferred   Abdomen:     Normal bowel sounds, no masses, no organomegaly, soft        non-tender, non-distended, no guarding, no rebound                 tenderness   Genitalia:    Deferred   Extremities:   Moves all extremities well, no edema, no cyanosis, no              redness   Pulses:   Pulses palpable and equal bilaterally   Skin:   No bleeding, bruising or rash   Lymph nodes:   No palpable adenopathy   Neurologic:   Cranial nerves 2 - 12 grossly intact, sensation intact, DTR        present and equal bilaterally       Results Review:    I reviewed the patient's new clinical results.    Assessment & Plan     1.  Severe superior mesenteric artery stenosis.  I will plan on a catheter-based mesenteric angiogram with superior mesenteric artery evaluation and potential stenting.    2.  Bilateral lower extremity claudication symptoms.  Right leg is recently been treated percutaneously with superficial femoral artery atherectomy and angioplasty.  I will perform a right leg angiogram as well to determine why she has not had significant improvements.  Left leg will be treated on a separate date.    **    I discussed the patients findings and my recommendations with patient and family.     Nickolas Florentino MD  11/16/22  10:19 EST

## 2022-11-16 NOTE — ANESTHESIA POSTPROCEDURE EVALUATION
Patient: Pily Martinez    Procedure Summary     Date: 11/16/22 Room / Location:  [facility] OR 02 / [facility] HYBRID MOUNIKA    Anesthesia Start: 1027 Anesthesia Stop:     Procedure: ARTERIOGRAM OF SUPERIOR MESENTERIC ARTERY, SUPERIOR MESENTERIC ARTERY STENT PLACEMENT, SUPERIOR MESENTERIC INTRAOPERATIVE ULTRASOUND, RIGHT POPLITEAL AND SUPERFICIAL FEMORAL ANGIOGRAM (Abdomen) Diagnosis:     Surgeons: Nickolas Florentino MD Provider: Darrell Rousseau MD    Anesthesia Type: MAC ASA Status: 3          Anesthesia Type: MAC    Vitals  Vitals Value Taken Time   BP     Temp     Pulse 81 11/16/22 1136   Resp     SpO2 92 % 11/16/22 1136   Vitals shown include unvalidated device data.        Post Anesthesia Care and Evaluation    Patient location during evaluation: PACU  Patient participation: complete - patient participated  Level of consciousness: awake and alert  Pain score: 0  Pain management: adequate    Airway patency: patent  Anesthetic complications: No anesthetic complications  PONV Status: none  Cardiovascular status: hemodynamically stable and acceptable  Respiratory status: nonlabored ventilation, acceptable, nasal cannula and spontaneous ventilation  Hydration status: acceptable    Comments: VSS  No anesthesia care post op

## 2023-06-07 ENCOUNTER — OFFICE VISIT (OUTPATIENT)
Dept: CARDIOLOGY | Facility: CLINIC | Age: 68
End: 2023-06-07
Payer: MEDICARE

## 2023-06-07 VITALS
BODY MASS INDEX: 25.85 KG/M2 | OXYGEN SATURATION: 97 % | SYSTOLIC BLOOD PRESSURE: 120 MMHG | HEIGHT: 67 IN | WEIGHT: 164.7 LBS | HEART RATE: 79 BPM | DIASTOLIC BLOOD PRESSURE: 58 MMHG

## 2023-06-07 DIAGNOSIS — I73.9 PAD (PERIPHERAL ARTERY DISEASE): ICD-10-CM

## 2023-06-07 DIAGNOSIS — I65.23 BILATERAL CAROTID ARTERY OCCLUSION: ICD-10-CM

## 2023-06-07 DIAGNOSIS — R07.2 PRECORDIAL PAIN: Primary | ICD-10-CM

## 2023-06-07 DIAGNOSIS — I25.118 CORONARY ARTERY DISEASE OF NATIVE ARTERY OF NATIVE HEART WITH STABLE ANGINA PECTORIS: ICD-10-CM

## 2023-06-07 DIAGNOSIS — E78.5 DYSLIPIDEMIA: ICD-10-CM

## 2023-06-07 DIAGNOSIS — I10 ESSENTIAL HYPERTENSION: ICD-10-CM

## 2023-06-07 PROCEDURE — 3078F DIAST BP <80 MM HG: CPT | Performed by: INTERNAL MEDICINE

## 2023-06-07 PROCEDURE — 93000 ELECTROCARDIOGRAM COMPLETE: CPT | Performed by: INTERNAL MEDICINE

## 2023-06-07 PROCEDURE — 99214 OFFICE O/P EST MOD 30 MIN: CPT | Performed by: INTERNAL MEDICINE

## 2023-06-07 PROCEDURE — 3074F SYST BP LT 130 MM HG: CPT | Performed by: INTERNAL MEDICINE

## 2023-06-07 RX ORDER — METFORMIN HYDROCHLORIDE 500 MG/1
500 TABLET, FILM COATED, EXTENDED RELEASE ORAL 3 TIMES DAILY
COMMUNITY
End: 2023-06-07

## 2023-06-07 RX ORDER — ISOSORBIDE MONONITRATE 30 MG/1
30 TABLET, EXTENDED RELEASE ORAL DAILY
COMMUNITY
Start: 2023-05-08

## 2023-06-07 RX ORDER — INSULIN GLARGINE 100 [IU]/ML
60 INJECTION, SOLUTION SUBCUTANEOUS NIGHTLY
COMMUNITY

## 2023-06-07 RX ORDER — GLIPIZIDE AND METFORMIN HCL 5; 500 MG/1; MG/1
TABLET, FILM COATED ORAL
COMMUNITY
Start: 2023-05-08

## 2023-06-07 RX ORDER — THIAMINE HCL 100 MG
TABLET ORAL DAILY
COMMUNITY

## 2023-06-07 RX ORDER — INSULIN LISPRO 100 [IU]/ML
15 INJECTION, SOLUTION INTRAVENOUS; SUBCUTANEOUS
COMMUNITY

## 2023-06-07 RX ORDER — SACUBITRIL AND VALSARTAN 24; 26 MG/1; MG/1
1 TABLET, FILM COATED ORAL 2 TIMES DAILY
COMMUNITY
Start: 2023-02-06

## 2023-06-07 NOTE — PROGRESS NOTES
[facility] Cardiology   [address], [address]  [place], [place], [address] [phone]  [email or web address] Institute[facility]ap[facility]           OUTPATIENT CLINIC FOLLOW UP NOTE    Patient Care Team:  Patient Care Team:  Savi Moreira MD as PCP - General    Subjective:      Chief Complaint   Patient presents with    Coronary Artery Disease       HPI:    Pily Martinez is a 67 y.o. female.  Previously followed by Dr. Bearden for many years up until 2020    Cardiac focused, problem list:  Coronary artery disease.  Family history of coronary artery disease.  Nonobstructive by catheterization, 2009.  Normal nuclear myocardial perfusion study, July 2012.  Abnormal nuclear myocardial perfusion study, July 2014.    [facility] admission for CP, echo 06/06/2014:  EF 60%, no VHD, no wall motion abnormalities.  Everolimus eluting stents, proximal LAD and mid-circumflex, 07/12/2014.    Stress test 2016, OSH, negative for ischemia   Asymptomatic bradycardia and rare atrial tachycardia.  Evaluated  by Tyson Cardenas MD --not PPM candidate.  Carotid artery disease.  Chronically occluded LICA.  Carotid duplex, July 2013:   of LICA, peak velocity ELLE = 134.  Carotid duplex, 9/2016:  LICA, ELLE 50-69%  Carotid duplex 12/2018:  of LICA, since 2016, velocities in ELLE are slightly increased with no change in ICA/CCA and velocity criteria consistent with <70% stenosis   Carotid 5/2022, vs 12/2020, no sig change  Lower extremity and mesenteric PAD  SMA stenosis, status post PTA, by [facility]SA  Right lower extremity PAD status post PTA, by FSA  Diabetes mellitus,  insulin requiring.  Essential Hypertension.  Dyslipidemia.  Gastroesophageal reflux disease.  Fibromyalgia.  History of pernicious anemia, data deficit  Left shoulder repair due to traumatic fall    HPI:   Intermittent chest discomfort.  No clear improvement with Imdur.  Stable exertional dyspnea and fatigue/weakness.     Review of Systems:  As noted above in the  HPI     PFSH:  Patient Active Problem List   Diagnosis    Carotid artery disease    Coronary artery disease    Twan-tachy syndrome    Insulin dependent diabetes mellitus    Hypertension    Dyslipidemia    GERD (gastroesophageal reflux disease)    Fibromyalgia         Current Outpatient Medications:     albuterol 108 (90 Base) MCG/ACT inhaler, Inhale 2 puffs As Needed., Disp: , Rfl:     aspirin 81 MG EC tablet, Take 1 tablet by mouth Daily., Disp: , Rfl:     cholecalciferol (VITAMIN D3) 25 MCG (1000 UT) tablet, Take 1 tablet by mouth Daily., Disp: , Rfl:     clopidogrel (PLAVIX) 75 MG tablet, Take 1 tablet by mouth Daily., Disp: 90 tablet, Rfl: 4    Entresto 24-26 MG tablet, Take 1 tablet by mouth 2 (Two) Times a Day., Disp: , Rfl:     ezetimibe (ZETIA) 10 MG tablet, TAKE ONE TABLET BY MOUTH ONCE DAILY FOR CHOLESTEROL, Disp: , Rfl:     glipiZIDE-metFORMIN (METAGLIP) 5-500 MG per tablet, 1 po qam and 2 tablets po every  evening with supper., Disp: , Rfl:     Insulin Glargine (Lantus SoloStar) 100 UNIT/ML injection pen, Inject 60 Units under the skin into the appropriate area as directed Every Night., Disp: , Rfl:     Insulin Lispro, 1 Unit Dial, (HumaLOG KwikPen) 100 UNIT/ML solution pen-injector, Inject 15 Units under the skin into the appropriate area as directed 3 (Three) Times a Day Before Meals., Disp: , Rfl:     isosorbide mononitrate (IMDUR) 30 MG 24 hr tablet, Take 1 tablet by mouth Daily., Disp: , Rfl:     Loratadine 10 MG capsule, Take  by mouth As Needed., Disp: , Rfl:     metoprolol tartrate (LOPRESSOR) 50 MG tablet, 1 tablet 2 (Two) Times a Day., Disp: , Rfl: 1    nitroglycerin (NITROSTAT) 0.4 MG SL tablet, Place 1 tablet under the tongue Every 5 (Five) Minutes As Needed for Chest Pain. Take no more than 3 doses in 15 minutes., Disp: 25 tablet, Rfl: 11    vitamin B-12 (CYANOCOBALAMIN) 2500 MCG sublingual tablet tablet, Place  under the tongue Daily., Disp: , Rfl:     insulin aspart (NovoLOG FlexPen) 100  "UNIT/ML solution pen-injector sc pen, Inject 15 Units under the skin into the appropriate area as directed 3 (Three) Times a Day for 90 days., Disp: 40.5 mL, Rfl: 0     reports that she has quit smoking. She has never used smokeless tobacco.      Objective:   Physical exam:  /58 (BP Location: Right arm, Patient Position: Sitting)   Pulse 79   Ht 170.2 cm (67\")   Wt 74.7 kg (164 lb 11.2 oz)   SpO2 97%   BMI 25.80 kg/m²   CONSTITUTIONAL: No acute distress  RESPIRATORY: Normal effort. Clear to auscultation bilaterally without wheezing or rales  CARDIOVASCULAR: Carotids with normal upstrokes without bruits.  Regular rate and rhythm with normal S1 and S2. Without murmur. Normal radial pulse. There is no significant lower extremity edema bilaterally.    5/18/2022 exam: Right pedal pulses nonpalpable, left DP and PT pulses 1+, hyperemic toes, spider veins    Labs:    Lab Results   Component Value Date     (H) 07/11/2014     No components found for: LDLDIRECTC    Diagnostic Data:      ECG 12 Lead    Date/Time: 6/7/2023 1:54 PM  Performed by: Vincent Villa MD  Authorized by: Vincent Villa MD   Comparison: compared with previous ECG from 5/18/2022  Comparison to previous ECG: Nonspecific T wave abnormality  Rhythm: sinus rhythm        OSH arterial duplex 8/2021: Normal ABIs of 1.0 bilaterally.  Retrograde DP flow in the right lower extremity, but with triphasic waveforms throughout otherwise    Assessment and Plan:     Coronary artery disease   Dyslipidemia  -Ongoing chest symptoms, possible angina  -Recommend Lexiscan nuclear stress test  -Continue aspirin, clopidogrel, beta-blocker, Zetia  -Statin intolerant  -Repatha previously too expensive.  Did not have other grants available for price reduction  -Acceptable lipid panel, followed by PCP    -Consider switching clopidogrel to Xarelto long-term.  In the interim, we will continue DAPT with upcoming stress test and possible heart cath/PCI    Bilateral " carotid artery occlusion  -DAPT, cholesterol lowering therapies as noted above  -Repeat carotid duplex ultrasound    Essential hypertension  -Continue current medication    Lower extremity PAD  SMA stenosis  -Followed and managed by [facility]SA    - Return in about 1 year (around 6/7/2024) for Next scheduled follow-up with an ECG.    Vincent Villa MD, MSc, FACC, [facility]  Interventional Cardiology  [facility]

## 2023-07-25 ENCOUNTER — HOSPITAL ENCOUNTER (OUTPATIENT)
Dept: CARDIOLOGY | Facility: HOSPITAL | Age: 68
Discharge: HOME | End: 2023-07-25
Admitting: INTERNAL MEDICINE
Payer: MEDICARE

## 2023-07-25 VITALS
DIASTOLIC BLOOD PRESSURE: 84 MMHG | HEART RATE: 75 BPM | HEIGHT: 67 IN | BODY MASS INDEX: 25.85 KG/M2 | SYSTOLIC BLOOD PRESSURE: 144 MMHG | WEIGHT: 164.68 LBS

## 2023-07-25 DIAGNOSIS — R07.2 PRECORDIAL PAIN: ICD-10-CM

## 2023-07-25 LAB
BH CV REST NUCLEAR ISOTOPE DOSE: 9.9 MCI
BH CV STRESS BP STAGE 2: NORMAL
BH CV STRESS BP STAGE 4: NORMAL
BH CV STRESS COMMENTS STAGE 1: NORMAL
BH CV STRESS DOSE REGADENOSON STAGE 1: 0.4
BH CV STRESS DURATION MIN STAGE 1: 1
BH CV STRESS DURATION MIN STAGE 2: 1
BH CV STRESS DURATION MIN STAGE 3: 1
BH CV STRESS DURATION MIN STAGE 4: 1
BH CV STRESS DURATION SEC STAGE 1: 0
BH CV STRESS DURATION SEC STAGE 2: 0
BH CV STRESS DURATION SEC STAGE 3: 0
BH CV STRESS DURATION SEC STAGE 4: 0
BH CV STRESS HR STAGE 1: 91
BH CV STRESS HR STAGE 2: 114
BH CV STRESS HR STAGE 3: 109
BH CV STRESS HR STAGE 4: 100
BH CV STRESS NUCLEAR ISOTOPE DOSE: 33 MCI
BH CV STRESS O2 STAGE 1: 100
BH CV STRESS O2 STAGE 2: 100
BH CV STRESS O2 STAGE 3: 100
BH CV STRESS O2 STAGE 4: 100
BH CV STRESS PROTOCOL 1: NORMAL
BH CV STRESS RECOVERY BP: NORMAL MMHG
BH CV STRESS RECOVERY HR: 89 BPM
BH CV STRESS RECOVERY O2: 100 %
BH CV STRESS STAGE 1: 1
BH CV STRESS STAGE 2: 2
BH CV STRESS STAGE 3: 3
BH CV STRESS STAGE 4: 4
LV EF NUC BP: 75 %
MAXIMAL PREDICTED HEART RATE: 153 BPM
PERCENT MAX PREDICTED HR: 74.51 %
STRESS BASELINE BP: NORMAL MMHG
STRESS BASELINE HR: 75 BPM
STRESS O2 SAT REST: 99 %
STRESS PERCENT HR: 88 %
STRESS POST ESTIMATED WORKLOAD: 1 METS
STRESS POST EXERCISE DUR MIN: 4 MIN
STRESS POST EXERCISE DUR SEC: 0 SEC
STRESS POST O2 SAT PEAK: 100 %
STRESS POST PEAK BP: NORMAL MMHG
STRESS POST PEAK HR: 114 BPM
STRESS TARGET HR: 130 BPM

## 2023-07-25 PROCEDURE — 25010000002 REGADENOSON 0.4 MG/5ML SOLUTION: Performed by: INTERNAL MEDICINE

## 2023-07-25 PROCEDURE — 93018 CV STRESS TEST I&R ONLY: CPT | Performed by: INTERNAL MEDICINE

## 2023-07-25 PROCEDURE — 93017 CV STRESS TEST TRACING ONLY: CPT

## 2023-07-25 PROCEDURE — 0 TECHNETIUM SESTAMIBI: Performed by: INTERNAL MEDICINE

## 2023-07-25 PROCEDURE — A9500 TC99M SESTAMIBI: HCPCS | Performed by: INTERNAL MEDICINE

## 2023-07-25 PROCEDURE — 78452 HT MUSCLE IMAGE SPECT MULT: CPT | Performed by: INTERNAL MEDICINE

## 2023-07-25 PROCEDURE — 78452 HT MUSCLE IMAGE SPECT MULT: CPT

## 2023-07-25 RX ORDER — REGADENOSON 0.08 MG/ML
0.4 INJECTION, SOLUTION INTRAVENOUS ONCE
Status: COMPLETED | OUTPATIENT
Start: 2023-07-25 | End: 2023-07-25

## 2023-07-25 RX ADMIN — TECHNETIUM TC 99M SESTAMIBI 1 DOSE: 1 INJECTION INTRAVENOUS at 11:05

## 2023-07-25 RX ADMIN — TECHNETIUM TC 99M SESTAMIBI 1 DOSE: 1 INJECTION INTRAVENOUS at 13:00

## 2023-07-25 RX ADMIN — REGADENOSON 0.4 MG: 0.08 INJECTION, SOLUTION INTRAVENOUS at 12:59

## 2023-07-26 ENCOUNTER — TELEPHONE (OUTPATIENT)
Dept: CARDIOLOGY | Facility: CLINIC | Age: 68
End: 2023-07-26
Payer: MEDICARE

## 2023-07-26 RX ORDER — ISOSORBIDE MONONITRATE 60 MG/1
60 TABLET, EXTENDED RELEASE ORAL DAILY
Qty: 90 TABLET | Refills: 3 | Status: SHIPPED | OUTPATIENT
Start: 2023-07-26

## 2023-07-26 NOTE — TELEPHONE ENCOUNTER
Discussed results with patient. Patient is agreeable to plan, all questions answered at this time.

## 2023-07-26 NOTE — TELEPHONE ENCOUNTER
----- Message from Vincent Villa MD sent at 7/25/2023  7:42 PM EDT -----  Mixed results on her stress test.  Perfusion imaging not suggestive of major blockage, but calcification confirming that she has at least mild plaque.  Recommend increasing Imdur to 60 mg daily.  Intolerant to statins and could not afford Repatha in the past.  Start Zetia 10 mg daily, unless she has tried that before 2 as well.  If with persistent chest pains over the next couple weeks despite these changes, she should give us a call.  Would recommend a heart catheterization for definitive evaluation  ----- Message -----  From: Vincent Villa MD  Sent: 7/25/2023   7:42 PM EDT  To: Vincent Villa MD

## 2024-09-04 ENCOUNTER — OFFICE VISIT (OUTPATIENT)
Dept: CARDIOLOGY | Facility: CLINIC | Age: 69
End: 2024-09-04
Payer: MEDICARE

## 2024-09-04 VITALS
HEIGHT: 67 IN | DIASTOLIC BLOOD PRESSURE: 72 MMHG | HEART RATE: 90 BPM | SYSTOLIC BLOOD PRESSURE: 134 MMHG | BODY MASS INDEX: 23.59 KG/M2 | WEIGHT: 150.3 LBS | OXYGEN SATURATION: 100 %

## 2024-09-04 DIAGNOSIS — E78.5 DYSLIPIDEMIA: ICD-10-CM

## 2024-09-04 DIAGNOSIS — I10 ESSENTIAL HYPERTENSION: ICD-10-CM

## 2024-09-04 DIAGNOSIS — R06.09 DYSPNEA ON EXERTION: ICD-10-CM

## 2024-09-04 DIAGNOSIS — I25.10 CORONARY ARTERY DISEASE INVOLVING NATIVE CORONARY ARTERY OF NATIVE HEART WITHOUT ANGINA PECTORIS: Primary | ICD-10-CM

## 2024-09-04 DIAGNOSIS — I65.23 BILATERAL CAROTID ARTERY OCCLUSION: ICD-10-CM

## 2024-09-04 DIAGNOSIS — I73.9 PAD (PERIPHERAL ARTERY DISEASE): ICD-10-CM

## 2024-09-04 PROCEDURE — 99214 OFFICE O/P EST MOD 30 MIN: CPT | Performed by: HOSPITALIST

## 2024-09-04 PROCEDURE — 1160F RVW MEDS BY RX/DR IN RCRD: CPT | Performed by: HOSPITALIST

## 2024-09-04 PROCEDURE — 3078F DIAST BP <80 MM HG: CPT | Performed by: HOSPITALIST

## 2024-09-04 PROCEDURE — 3075F SYST BP GE 130 - 139MM HG: CPT | Performed by: HOSPITALIST

## 2024-09-04 PROCEDURE — 93000 ELECTROCARDIOGRAM COMPLETE: CPT | Performed by: HOSPITALIST

## 2024-09-04 PROCEDURE — 1159F MED LIST DOCD IN RCRD: CPT | Performed by: HOSPITALIST

## 2024-09-04 RX ORDER — FLUCONAZOLE 150 MG/1
150 TABLET ORAL DAILY
COMMUNITY
Start: 2024-08-23

## 2024-09-04 RX ORDER — ORAL SEMAGLUTIDE 7 MG/1
TABLET ORAL
COMMUNITY
Start: 2024-08-29

## 2024-09-04 RX ORDER — PREDNISOLONE ACETATE 10 MG/ML
SUSPENSION/ DROPS OPHTHALMIC
COMMUNITY
Start: 2024-08-30

## 2024-09-04 NOTE — PROGRESS NOTES
[facility] Cardiology   [address], [address]  [place], [place], [address]    [phone]  [email or web address]           OUTPATIENT CLINIC FOLLOW UP NOTE    Patient Care Team:  Patient Care Team:  Savi Moreira MD as PCP - General    Subjective:      Chief Complaint   Patient presents with    Coronary Artery Disease     1 year f/u    Precordial pain       HPI:    Pily Martinez is a 69 y.o. female.  Previously followed by Dr. Bearden for many years up until 2020    Cardiac focused, problem list:  Coronary artery disease.  Family history of coronary artery disease.  Nonobstructive by catheterization, 2009.  Normal nuclear myocardial perfusion study, July 2012.  Abnormal nuclear myocardial perfusion study, July 2014.    [facility] admission for CP, echo 06/06/2014:  EF 60%, no VHD, no wall motion abnormalities.  Everolimus eluting stents, proximal LAD and mid-circumflex, 07/12/2014.    Stress test 2016, OSH, negative for ischemia  Stress test 7/25/2023: Normal myocardial perfusion study with no evidence of ischemia.  Severe, multivessel, diffuse coronary artery calcification noted on CT portion.  Low to intermediate risk study due to severe coronary artery calcification   Asymptomatic bradycardia and rare atrial tachycardia.  Evaluated  by Tyson Cardenas MD --not PPM candidate.  Carotid artery disease.  Chronically occluded LICA.  Carotid duplex, July 2013:   of LICA, peak velocity ELLE = 134.  Carotid duplex, 9/2016:  LICA, ELLE 50-69%  Carotid duplex 12/2018:  of LICA, since 2016, velocities in ELLE are slightly increased with no change in ICA/CCA and velocity criteria consistent with <70% stenosis   Carotid 5/2022, vs 12/2020, no sig change  Lower extremity and mesenteric PAD  SMA stenosis, status post PTA, by [facility]SA  Right lower extremity PAD status post PTA, by FSA  Diabetes mellitus,  insulin requiring.  Essential Hypertension.  Dyslipidemia.  Gastroesophageal reflux  disease.  Fibromyalgia.  History of pernicious anemia, data deficit  Left shoulder repair due to traumatic fall    HPI:   Patient presents today for follow up.  Chest pain has improved since last visit.  However, she does continue to have dyspnea on exertion, fatigue and lightheadedness.    Review of Systems:  As noted above in the HPI     PFSH:  Patient Active Problem List   Diagnosis    Carotid artery disease    Coronary artery disease    Twan-tachy syndrome    Insulin dependent diabetes mellitus    Essential hypertension    Dyslipidemia    GERD (gastroesophageal reflux disease)    Fibromyalgia    PAD (peripheral artery disease)         Current Outpatient Medications:     albuterol 108 (90 Base) MCG/ACT inhaler, Inhale 2 puffs As Needed., Disp: , Rfl:     aspirin 81 MG EC tablet, Take 1 tablet by mouth Daily., Disp: , Rfl:     cholecalciferol (VITAMIN D3) 25 MCG (1000 UT) tablet, Take 1 tablet by mouth Daily., Disp: , Rfl:     clopidogrel (PLAVIX) 75 MG tablet, Take 1 tablet by mouth Daily., Disp: 90 tablet, Rfl: 4    Entresto 24-26 MG tablet, Take 1 tablet by mouth 2 (Two) Times a Day., Disp: , Rfl:     esomeprazole (nexIUM) 20 MG capsule, Take 1 capsule by mouth Daily., Disp: , Rfl:     ezetimibe (ZETIA) 10 MG tablet, TAKE ONE TABLET BY MOUTH ONCE DAILY FOR CHOLESTEROL, Disp: , Rfl:     glipiZIDE-metFORMIN (METAGLIP) 5-500 MG per tablet, 1 po qam and 2 tablets po every  evening with supper., Disp: , Rfl:     Insulin Glargine (Lantus SoloStar) 100 UNIT/ML injection pen, Inject 60 Units under the skin into the appropriate area as directed Every Night., Disp: , Rfl:     Insulin Lispro, 1 Unit Dial, (HumaLOG KwikPen) 100 UNIT/ML solution pen-injector, Inject 15 Units under the skin into the appropriate area as directed 3 (Three) Times a Day Before Meals., Disp: , Rfl:     isosorbide mononitrate (IMDUR) 60 MG 24 hr tablet, Take 1 tablet by mouth Daily., Disp: 90 tablet, Rfl: 3    Loratadine 10 MG capsule, Take  by  "mouth As Needed., Disp: , Rfl:     metoprolol tartrate (LOPRESSOR) 50 MG tablet, 1 tablet 2 (Two) Times a Day., Disp: , Rfl: 1    nitroglycerin (NITROSTAT) 0.4 MG SL tablet, Place 1 tablet under the tongue Every 5 (Five) Minutes As Needed for Chest Pain. Take no more than 3 doses in 15 minutes., Disp: 25 tablet, Rfl: 11    prednisoLONE acetate (PRED FORTE) 1 % ophthalmic suspension, , Disp: , Rfl:     Rybelsus 7 MG tablet, , Disp: , Rfl:     vitamin B-12 (CYANOCOBALAMIN) 2500 MCG sublingual tablet tablet, Place  under the tongue Daily., Disp: , Rfl:     fluconazole (DIFLUCAN) 150 MG tablet, Take 1 tablet by mouth Daily. (Patient not taking: Reported on 9/4/2024), Disp: , Rfl:     insulin aspart (NovoLOG FlexPen) 100 UNIT/ML solution pen-injector sc pen, Inject 15 Units under the skin into the appropriate area as directed 3 (Three) Times a Day for 90 days., Disp: 40.5 mL, Rfl: 0     reports that she has quit smoking. She has been exposed to tobacco smoke. She has never used smokeless tobacco.      Objective:   Physical exam:  /72 (BP Location: Left arm, Patient Position: Sitting, Cuff Size: Adult)   Pulse 90   Ht 170.2 cm (67.01\")   Wt 68.2 kg (150 lb 4.8 oz)   SpO2 100%   BMI 23.53 kg/m²   CONSTITUTIONAL: No acute distress  RESPIRATORY: Normal effort. Clear to auscultation bilaterally without wheezing or rales  CARDIOVASCULAR: Carotids with normal upstrokes without bruits.  Regular rate and rhythm with normal S1 and S2. Without murmur. Normal radial pulse. There is no significant lower extremity edema bilaterally.    5/18/2022 exam: Right pedal pulses nonpalpable, left DP and PT pulses 1+, hyperemic toes, spider veins    Labs:    Lab Results   Component Value Date     (H) 07/11/2014     No components found for: \"LDLDIRECTC\"    PCP labs, 8/2024  TSH: 2.9  CMP: , BUN 21, creatinine 0.91, , K4.6, AST 16, ALT 23  CBC: WBC 6.4, Hgb 14.8, HCT 45.9,   Hemoglobin A1c 13.1  Lipid panel: " , , HDL 59,     Diagnostic Data:      ECG 12 Lead    Date/Time: 9/4/2024 1:11 PM  Performed by: Roseanne Leon APRN    Authorized by: Roseanne Leon APRN  Comparison: compared with previous ECG from 6/7/2023  Similar to previous ECG  Rhythm: sinus rhythm  Rate: normal  BPM: 90  Conduction: conduction normal          OSH arterial duplex 8/2021: Normal ABIs of 1.0 bilaterally.  Retrograde DP flow in the right lower extremity, but with triphasic waveforms throughout otherwise    Assessment and Plan:     Coronary artery disease   Dyslipidemia  Dyspnea on exertion  Fatigue  -Infrequent, fleeting chest pain.    -Repeat stress test last year with mixed findings, no significant blockage but did show coronary artery calcification.  -Check echocardiogram to rule out structural heart disease.  -Continue aspirin, clopidogrel, beta-blocker, Zetia  -Statin intolerant  -Repatha previously too expensive.  Did not have other grants available for price reduction  -Acceptable lipid panel, followed by PCP    -Consider switching clopidogrel to Xarelto long-term.     Bilateral carotid artery occlusion  -DAPT, cholesterol lowering therapies as noted above  -Repeat carotid duplex ultrasound    Essential hypertension  -Continue current medication    Lower extremity PAD  SMA stenosis  -Followed and managed by [facility].    - Return in about 1 year (around 9/4/2025) for Next scheduled follow up with EKG .    Electronically signed by HAWA Wayne, 09/04/24, 2:14 PM EDT.

## 2024-09-30 ENCOUNTER — HOSPITAL ENCOUNTER (OUTPATIENT)
Facility: HOSPITAL | Age: 69
Discharge: HOME | End: 2024-09-30
Payer: MEDICARE

## 2024-09-30 VITALS — BODY MASS INDEX: 23.54 KG/M2 | HEIGHT: 67 IN | WEIGHT: 150 LBS

## 2024-09-30 VITALS — HEIGHT: 67 IN | WEIGHT: 150 LBS | BODY MASS INDEX: 23.54 KG/M2

## 2024-09-30 DIAGNOSIS — I25.10 CORONARY ARTERY DISEASE INVOLVING NATIVE CORONARY ARTERY OF NATIVE HEART WITHOUT ANGINA PECTORIS: ICD-10-CM

## 2024-09-30 DIAGNOSIS — I73.9 PAD (PERIPHERAL ARTERY DISEASE): ICD-10-CM

## 2024-09-30 DIAGNOSIS — R06.09 DYSPNEA ON EXERTION: ICD-10-CM

## 2024-09-30 DIAGNOSIS — I65.23 BILATERAL CAROTID ARTERY OCCLUSION: ICD-10-CM

## 2024-09-30 LAB
ASCENDING AORTA: 2.6 CM
BH CV ECHO MEAS - AO MAX PG: 7.5 MMHG
BH CV ECHO MEAS - AO MEAN PG: 4.3 MMHG
BH CV ECHO MEAS - AO ROOT DIAM: 2.5 CM
BH CV ECHO MEAS - AO V2 MAX: 136.7 CM/SEC
BH CV ECHO MEAS - AO V2 VTI: 30.3 CM
BH CV ECHO MEAS - AVA(I,D): 1.73 CM2
BH CV ECHO MEAS - EDV(CUBED): 68.9 ML
BH CV ECHO MEAS - EDV(MOD-SP2): 48.1 ML
BH CV ECHO MEAS - EDV(MOD-SP4): 54.7 ML
BH CV ECHO MEAS - EF(MOD-BP): 55.2 %
BH CV ECHO MEAS - EF(MOD-SP2): 52.8 %
BH CV ECHO MEAS - EF(MOD-SP4): 55 %
BH CV ECHO MEAS - ESV(CUBED): 8 ML
BH CV ECHO MEAS - ESV(MOD-SP2): 22.7 ML
BH CV ECHO MEAS - ESV(MOD-SP4): 24.6 ML
BH CV ECHO MEAS - FS: 51.2 %
BH CV ECHO MEAS - IVS/LVPW: 1.13 CM
BH CV ECHO MEAS - IVSD: 0.9 CM
BH CV ECHO MEAS - LA DIMENSION: 3.7 CM
BH CV ECHO MEAS - LAT PEAK E' VEL: 9.8 CM/SEC
BH CV ECHO MEAS - LV DIASTOLIC VOL/BSA (35-75): 30.6 CM2
BH CV ECHO MEAS - LV MASS(C)D: 105.6 GRAMS
BH CV ECHO MEAS - LV MAX PG: 2.5 MMHG
BH CV ECHO MEAS - LV MEAN PG: 1 MMHG
BH CV ECHO MEAS - LV SYSTOLIC VOL/BSA (12-30): 13.8 CM2
BH CV ECHO MEAS - LV V1 MAX: 79 CM/SEC
BH CV ECHO MEAS - LV V1 VTI: 18.5 CM
BH CV ECHO MEAS - LVIDD: 4.1 CM
BH CV ECHO MEAS - LVIDS: 2 CM
BH CV ECHO MEAS - LVOT AREA: 2.8 CM2
BH CV ECHO MEAS - LVOT DIAM: 1.9 CM
BH CV ECHO MEAS - LVPWD: 0.8 CM
BH CV ECHO MEAS - MED PEAK E' VEL: 10 CM/SEC
BH CV ECHO MEAS - MV A MAX VEL: 123.5 CM/SEC
BH CV ECHO MEAS - MV DEC SLOPE: 1020 CM/SEC2
BH CV ECHO MEAS - MV DEC TIME: 0.15 SEC
BH CV ECHO MEAS - MV E MAX VEL: 152.5 CM/SEC
BH CV ECHO MEAS - MV E/A: 1.23
BH CV ECHO MEAS - MV MAX PG: 11.3 MMHG
BH CV ECHO MEAS - MV MEAN PG: 6 MMHG
BH CV ECHO MEAS - MV V2 VTI: 36 CM
BH CV ECHO MEAS - MVA(VTI): 1.46 CM2
BH CV ECHO MEAS - PA ACC TIME: 0.16 SEC
BH CV ECHO MEAS - PA V2 MAX: 105.5 CM/SEC
BH CV ECHO MEAS - SV(LVOT): 52.5 ML
BH CV ECHO MEAS - SV(MOD-SP2): 25.4 ML
BH CV ECHO MEAS - SV(MOD-SP4): 30.1 ML
BH CV ECHO MEAS - SVI(LVOT): 29.3 ML/M2
BH CV ECHO MEAS - SVI(MOD-SP2): 14.2 ML/M2
BH CV ECHO MEAS - SVI(MOD-SP4): 16.8 ML/M2
BH CV ECHO MEAS - TAPSE (>1.6): 1.88 CM
BH CV ECHO MEASUREMENTS AVERAGE E/E' RATIO: 15.4
BH CV VAS BP LEFT ARM: NORMAL MMHG
BH CV XLRA - RV BASE: 2.5 CM
BH CV XLRA - RV LENGTH: 7.4 CM
BH CV XLRA - RV MID: 2.2 CM
BH CV XLRA - TDI S': 10.9 CM/SEC
BH CV XLRA MEAS LEFT DIST CCA EDV: 6 CM/SEC
BH CV XLRA MEAS LEFT DIST CCA PSV: 30.4 CM/SEC
BH CV XLRA MEAS LEFT MID CCA PSV: 52 CM/SEC
BH CV XLRA MEAS LEFT PROX CCA PSV: 56.5 CM/SEC
BH CV XLRA MEAS LEFT PROX ECA EDV: 17.5 CM/SEC
BH CV XLRA MEAS LEFT PROX ECA PSV: 65.9 CM/SEC
BH CV XLRA MEAS LEFT PROX SCLA PSV: 294 CM/SEC
BH CV XLRA MEAS LEFT VERTEBRAL A EDV: 21 CM/SEC
BH CV XLRA MEAS LEFT VERTEBRAL A PSV: 101 CM/SEC
BH CV XLRA MEAS RIGHT DIST CCA EDV: 20.7 CM/SEC
BH CV XLRA MEAS RIGHT DIST CCA PSV: 62.7 CM/SEC
BH CV XLRA MEAS RIGHT DIST ICA EDV: 47.5 CM/SEC
BH CV XLRA MEAS RIGHT DIST ICA PSV: 141 CM/SEC
BH CV XLRA MEAS RIGHT ICA/CCA RATIO: 2.8
BH CV XLRA MEAS RIGHT MID CCA EDV: 15.1 CM/SEC
BH CV XLRA MEAS RIGHT MID CCA PSV: 53.9 CM/SEC
BH CV XLRA MEAS RIGHT MID ICA EDV: 43.6 CM/SEC
BH CV XLRA MEAS RIGHT MID ICA PSV: 151 CM/SEC
BH CV XLRA MEAS RIGHT PROX CCA EDV: 15.8 CM/SEC
BH CV XLRA MEAS RIGHT PROX CCA PSV: 67.2 CM/SEC
BH CV XLRA MEAS RIGHT PROX ECA EDV: 10.7 CM/SEC
BH CV XLRA MEAS RIGHT PROX ECA PSV: 137 CM/SEC
BH CV XLRA MEAS RIGHT PROX ICA EDV: 43.3 CM/SEC
BH CV XLRA MEAS RIGHT PROX ICA PSV: 146 CM/SEC
BH CV XLRA MEAS RIGHT PROX SCLA EDV: 12 CM/SEC
BH CV XLRA MEAS RIGHT PROX SCLA PSV: 211 CM/SEC
BH CV XLRA MEAS RIGHT VERTEBRAL A EDV: 19.6 CM/SEC
BH CV XLRA MEAS RIGHT VERTEBRAL A PSV: 88.3 CM/SEC
IVRT: 82 MS
LEFT ARM BP: NORMAL MMHG
LEFT ATRIUM VOLUME INDEX: 26.5 ML/M2
RIGHT ARM BP: NORMAL MMHG

## 2024-09-30 PROCEDURE — 93306 TTE W/DOPPLER COMPLETE: CPT | Performed by: INTERNAL MEDICINE

## 2024-09-30 PROCEDURE — 93880 EXTRACRANIAL BILAT STUDY: CPT | Performed by: INTERNAL MEDICINE

## 2024-09-30 PROCEDURE — 93880 EXTRACRANIAL BILAT STUDY: CPT

## 2024-09-30 PROCEDURE — 93306 TTE W/DOPPLER COMPLETE: CPT

## 2024-10-01 ENCOUNTER — TELEPHONE (OUTPATIENT)
Dept: CARDIOLOGY | Facility: CLINIC | Age: 69
End: 2024-10-01
Payer: MEDICARE

## 2024-10-01 NOTE — TELEPHONE ENCOUNTER
Called patient to discuss results. LVM. Awaiting return call.     Spoke with patient and discussed her echo and carotid duplex results. Patient agreeable to continuing with current treatment plan. No further questions at this time.

## 2024-10-01 NOTE — TELEPHONE ENCOUNTER
----- Message from Roseanne Leon sent at 10/1/2024  1:54 PM EDT -----  Can you let this patient know that her echocardiogram looked good.  It shows that her heart is pumping normally and there are no major valve issues.  Also her carotid duplex is stable.  There was no significant change when compared to the study performed in 2022.  Continue current plan of care.   Thank you.

## 2025-03-19 ENCOUNTER — OFFICE VISIT (OUTPATIENT)
Dept: CARDIOLOGY | Facility: CLINIC | Age: 70
End: 2025-03-19
Payer: MEDICARE

## 2025-03-19 VITALS
WEIGHT: 146.4 LBS | BODY MASS INDEX: 22.98 KG/M2 | SYSTOLIC BLOOD PRESSURE: 128 MMHG | HEIGHT: 67 IN | OXYGEN SATURATION: 97 % | DIASTOLIC BLOOD PRESSURE: 70 MMHG

## 2025-03-19 DIAGNOSIS — I25.10 CORONARY ARTERY DISEASE INVOLVING NATIVE CORONARY ARTERY OF NATIVE HEART WITHOUT ANGINA PECTORIS: Primary | ICD-10-CM

## 2025-03-19 DIAGNOSIS — I65.23 BILATERAL CAROTID ARTERY OCCLUSION: ICD-10-CM

## 2025-03-19 DIAGNOSIS — I10 ESSENTIAL HYPERTENSION: ICD-10-CM

## 2025-03-19 DIAGNOSIS — E78.5 DYSLIPIDEMIA: ICD-10-CM

## 2025-03-19 DIAGNOSIS — I73.9 PAD (PERIPHERAL ARTERY DISEASE): ICD-10-CM

## 2025-03-19 PROCEDURE — 1160F RVW MEDS BY RX/DR IN RCRD: CPT | Performed by: HOSPITALIST

## 2025-03-19 PROCEDURE — 1159F MED LIST DOCD IN RCRD: CPT | Performed by: HOSPITALIST

## 2025-03-19 PROCEDURE — 99214 OFFICE O/P EST MOD 30 MIN: CPT | Performed by: HOSPITALIST

## 2025-03-19 PROCEDURE — 93000 ELECTROCARDIOGRAM COMPLETE: CPT | Performed by: HOSPITALIST

## 2025-03-19 PROCEDURE — 3074F SYST BP LT 130 MM HG: CPT | Performed by: HOSPITALIST

## 2025-03-19 PROCEDURE — 3078F DIAST BP <80 MM HG: CPT | Performed by: HOSPITALIST

## 2025-03-19 RX ORDER — CIPROFLOXACIN 500 MG/1
500 TABLET, FILM COATED ORAL 2 TIMES DAILY
COMMUNITY
Start: 2025-03-10

## 2025-03-19 NOTE — PROGRESS NOTES
[facility] Cardiology   [address], [address]  [place], [place], [address] [phone]  WWW.[facility] [employer]           OUTPATIENT CLINIC FOLLOW UP NOTE    Patient Care Team:  Patient Care Team:  Savi Moreira MD as PCP - General  Roseanne Leon APRN as Nurse Practitioner (Cardiology)    Subjective:      Chief Complaint   Patient presents with    Coronary artery disease involving native coronary artery of    Follow-up     6 month follow up       HPI:    Pily Martinez is a 69 y.o. female.  Previously followed by Dr. Bearden for many years up until 2020    Cardiac focused, problem list:  Coronary artery disease.  Family history of coronary artery disease.  Nonobstructive by catheterization, 2009.  Normal nuclear myocardial perfusion study, July 2012.  Abnormal nuclear myocardial perfusion study, July 2014.    [facility] admission for CP, echo 06/06/2014:  EF 60%, no VHD, no wall motion abnormalities.  Everolimus eluting stents, proximal LAD and mid-circumflex, 07/12/2014.    Stress test 2016, OSH, negative for ischemia  Stress test 7/25/2023: Normal myocardial perfusion study with no evidence of ischemia.  Severe, multivessel, diffuse coronary artery calcification noted on CT portion.  Low to intermediate risk study due to severe coronary artery calcification  Echocardiogram 9/30/2024:  LVEF 56-60%. Mild MAC, mild MR.    Asymptomatic bradycardia and rare atrial tachycardia.  Evaluated  by Tyson Cardenas MD --not PPM candidate.  Carotid artery disease.  Chronically occluded LICA.  Carotid duplex, July 2013:   of LICA, peak velocity ELLE = 134.  Carotid duplex, 9/2016:  LICA, ELLE 50-69%  Carotid duplex 12/2018:  of LICA, since 2016, velocities in ELLE are slightly increased with no change in ICA/CCA and velocity criteria consistent with <70% stenosis   Carotid 5/2022, vs 12/2020, no sig change  Carotid duplex, 9/30/2024:  Right ICA stenosis at the upper end of the 0-49%  range.  Left ICA occluded.  No significant change when compared to study from 2022.  Lower extremity and mesenteric PAD  SMA stenosis, status post PTA, by [facility]  Right lower extremity PAD status post PTA, by [facility]  Diabetes mellitus,  insulin requiring.  Essential Hypertension.  Dyslipidemia.  Gastroesophageal reflux disease.  Fibromyalgia.  History of pernicious anemia, data deficit  Left shoulder repair due to traumatic fall    HPI:   Patient presents today for follow up.  Stable cardiac status since her last visit.  Continues to have intermittent lightheadedness, not worsening.  Blood sugars have been uncontrolled recently.  Has been referred back to endocrinology for management.  Denies chest pain, shortness of breath or edema    Review of Systems:  As noted above in the HPI     PFSH:  Patient Active Problem List   Diagnosis    Carotid artery disease    Coronary artery disease    Twan-tachy syndrome    Insulin dependent diabetes mellitus    Essential hypertension    Dyslipidemia    GERD (gastroesophageal reflux disease)    Fibromyalgia    PAD (peripheral artery disease)         Current Outpatient Medications:     albuterol 108 (90 Base) MCG/ACT inhaler, Inhale 2 puffs As Needed., Disp: , Rfl:     aspirin 81 MG EC tablet, Take 1 tablet by mouth Daily., Disp: , Rfl:     cholecalciferol (VITAMIN D3) 25 MCG (1000 UT) tablet, Take 1 tablet by mouth Daily., Disp: , Rfl:     ciprofloxacin (CIPRO) 500 MG tablet, Take 1 tablet by mouth 2 (Two) Times a Day., Disp: , Rfl:     clopidogrel (PLAVIX) 75 MG tablet, Take 1 tablet by mouth Daily., Disp: 90 tablet, Rfl: 4    Entresto 24-26 MG tablet, Take 1 tablet by mouth 2 (Two) Times a Day., Disp: , Rfl:     esomeprazole (nexIUM) 20 MG capsule, Take 1 capsule by mouth As Needed., Disp: , Rfl:     ezetimibe (ZETIA) 10 MG tablet, TAKE ONE TABLET BY MOUTH ONCE DAILY FOR CHOLESTEROL, Disp: , Rfl:     glipiZIDE-metFORMIN (METAGLIP) 5-500 MG per tablet, 1 po qam and 2 tablets po every   "evening with supper., Disp: , Rfl:     Insulin Glargine (Lantus SoloStar) 100 UNIT/ML injection pen, Inject 60 Units under the skin into the appropriate area as directed Every Night. (Patient taking differently: Inject 60 Units under the skin into the appropriate area as directed Every Night. Only taking when really high, makes her have muscle cramps, her  Is looking into this), Disp: , Rfl:     Insulin Lispro, 1 Unit Dial, (HumaLOG KwikPen) 100 UNIT/ML solution pen-injector, Inject 15 Units under the skin into the appropriate area as directed 3 (Three) Times a Day Before Meals. (Patient taking differently: Inject 15 Units under the skin into the appropriate area as directed 3 (Three) Times a Day Before Meals. Only take really high, causes muslce cramps, is looking into this), Disp: , Rfl:     isosorbide mononitrate (IMDUR) 60 MG 24 hr tablet, Take 1 tablet by mouth Daily., Disp: 90 tablet, Rfl: 3    Loratadine 10 MG capsule, Take  by mouth As Needed., Disp: , Rfl:     metoprolol tartrate (LOPRESSOR) 50 MG tablet, 1 tablet 2 (Two) Times a Day., Disp: , Rfl: 1    nitroglycerin (NITROSTAT) 0.4 MG SL tablet, Place 1 tablet under the tongue Every 5 (Five) Minutes As Needed for Chest Pain. Take no more than 3 doses in 15 minutes., Disp: 25 tablet, Rfl: 11    vitamin B-12 (CYANOCOBALAMIN) 2500 MCG sublingual tablet tablet, Place  under the tongue Daily., Disp: , Rfl:     insulin aspart (NovoLOG FlexPen) 100 UNIT/ML solution pen-injector sc pen, Inject 15 Units under the skin into the appropriate area as directed 3 (Three) Times a Day for 90 days., Disp: 40.5 mL, Rfl: 0     reports that she has quit smoking. She has been exposed to tobacco smoke. She has never used smokeless tobacco.      Objective:   Physical exam:  /70 (BP Location: Right arm, Patient Position: Sitting, Cuff Size: Adult)   Ht 170.2 cm (67.01\")   Wt 66.4 kg (146 lb 6.4 oz)   SpO2 97%   BMI 22.92 kg/m²   CONSTITUTIONAL: No acute " "distress  RESPIRATORY: Normal effort. Clear to auscultation bilaterally without wheezing or rales  CARDIOVASCULAR: Right carotid bruit present.  Regular rate and rhythm with normal S1 and S2. Without murmur. Normal radial pulse. There is no significant lower extremity edema bilaterally.    5/18/2022 exam: Right pedal pulses nonpalpable, left DP and PT pulses 1+, hyperemic toes, spider veins    Labs:    Lab Results   Component Value Date     (H) 07/11/2014     No components found for: \"LDLDIRECTC\"    PCP labs, 8/2024  TSH: 2.9  CMP: , BUN 21, creatinine 0.91, , K4.6, AST 16, ALT 23  CBC: WBC 6.4, Hgb 14.8, HCT 45.9,   Hemoglobin A1c 13.1  Lipid panel: , , HDL 59,     Diagnostic Data:      ECG 12 Lead    Date/Time: 3/19/2025 11:45 AM  Performed by: Roseanne Leon APRN    Authorized by: Roseanne Leon APRN  Comparison: compared with previous ECG from 9/4/2024  Similar to previous ECG  Rhythm: sinus rhythm and sinus arrhythmia  Rate: normal  BPM: 88  Conduction: conduction normal          Results for orders placed during the hospital encounter of 09/30/24    Adult Transthoracic Echo Complete W/ Cont if Necessary Per Protocol    Interpretation Summary    Left ventricular systolic function is normal. Left ventricular ejection fraction appears to be 56 - 60%.    Mild mitral annular calcification is present.    Mild mitral valve regurgitation is present.  OSH arterial duplex 8/2021: Normal ABIs of 1.0 bilaterally.  Retrograde DP flow in the right lower extremity, but with triphasic waveforms throughout otherwise    Assessment and Plan:     Coronary artery disease   Dyslipidemia  Dyspnea on exertion  Fatigue  -Currently without chest pain.   -Stress test 2023 with mixed findings, no significant blockage but did show coronary artery calcification.  -Echo 9/2024 with normal EF, mild MAC, mild MR.   -Continue aspirin, clopidogrel, beta-blocker, Zetia  -Statin " intolerant  -Repatha previously too expensive.  Did not have other grants available for price reduction  -Recent labs with PCP.  Will request results for review.   -Recommend yearly lipid panel with PCP.     -Consider switching clopidogrel to Xarelto long-term.     Bilateral carotid artery occlusion  -DAPT, cholesterol lowering therapies as noted above  -Repeat carotid duplex ultrasound 9/2024 stable.     Essential hypertension  -Continue current medication    Lower extremity PAD  SMA stenosis  -Followed and managed by [facility].    - Return in about 6 months (around 9/19/2025) for Next scheduled follow up with Dr. Villa.    Electronically signed by HAWA Wayne, 03/19/25, 12:16 PM EDT.

## 2025-06-18 ENCOUNTER — OFFICE VISIT (OUTPATIENT)
Age: 70
End: 2025-06-18
Payer: MEDICARE

## 2025-06-18 VITALS
SYSTOLIC BLOOD PRESSURE: 142 MMHG | DIASTOLIC BLOOD PRESSURE: 68 MMHG | OXYGEN SATURATION: 96 % | WEIGHT: 146.5 LBS | HEART RATE: 74 BPM | HEIGHT: 67 IN | BODY MASS INDEX: 22.99 KG/M2

## 2025-06-18 DIAGNOSIS — Z79.4 UNCONTROLLED DIABETES MELLITUS WITH HYPERGLYCEMIA, WITH LONG-TERM CURRENT USE OF INSULIN: Primary | ICD-10-CM

## 2025-06-18 DIAGNOSIS — E11.65 UNCONTROLLED DIABETES MELLITUS WITH HYPERGLYCEMIA, WITH LONG-TERM CURRENT USE OF INSULIN: Primary | ICD-10-CM

## 2025-06-18 LAB
EXPIRATION DATE: ABNORMAL
EXPIRATION DATE: ABNORMAL
GLUCOSE BLDC GLUCOMTR-MCNC: 450 MG/DL (ref 70–130)
HBA1C MFR BLD: 13.1 % (ref 4.5–5.7)
Lab: ABNORMAL
Lab: ABNORMAL

## 2025-06-18 NOTE — PROGRESS NOTES
"     Office Note      Date: 2025  Patient Name: Pily Martinez  MRN: [id number]  : 1955    Chief Complaint   Patient presents with    Diabetes       History of Present Illness:   Pily Martinez is a 69 y.o. female who presents for Diabetes type 2. Diagnosed in: . Treated in past with oral agents. Current treatments: PILLS AND INSULIN . Number of insulin shots per day: 1. Checks blood sugar: 1 times per day .  Has low blood sugar: rare.     Last A1c:  Hemoglobin A1C   Date Value Ref Range Status   2025 13.1 (A) 4.5 - 5.7 % Final   2022 11.90 (H) 4.80 - 5.60 % Final       Subjective       SHE TAKES INSULIN OCCASIONALLY AND RELUCTANTLY DUE TO MUSCLE PAIN     Review of Systems:   Review of Systems   Constitutional:  Positive for unexpected weight change.   Eyes:  Positive for visual disturbance.   Endocrine: Positive for polydipsia and polyuria.       The following portions of the patient's history were reviewed and updated as appropriate: allergies, current medications, past family history, past medical history, past social history, past surgical history, and problem list.    Objective     Visit Vitals  /68 (BP Location: Left arm, Patient Position: Sitting)   Pulse 74   Ht 170.2 cm (67\")   Wt 66.5 kg (146 lb 8 oz)   SpO2 96%   BMI 22.95 kg/m²           Physical Exam:  Physical Exam  Vitals reviewed.   Constitutional:       Appearance: She is ill-appearing.   Cardiovascular:      Rate and Rhythm: Normal rate.   Pulmonary:      Effort: Pulmonary effort is normal.   Neurological:      Mental Status: She is alert.   Psychiatric:         Mood and Affect: Mood normal.         Thought Content: Thought content normal.         Judgment: Judgment normal.         Assessment / Plan      Assessment & Plan:  Problem List Items Addressed This Visit       Uncontrolled diabetes mellitus with hyperglycemia, with long-term current use of insulin - Primary    Current Assessment & Plan   SHE " HAS DIABETES, COMPLICATED BY RETINOPATHY AND NEUROPATHY. HER DIABETES IS POORLY CONTROLLED .  IN LOOKING AT THE OLD RECORDS FROM OUR OLD PRACTICE WHEN SHE SAW [facility], SHE WAS NOT COMPLIANT WITH INSULIN BACK THEN EITHER AND IT LOOKS LIKE HER A1C WAS CONSISTENTLY HIGHER THAN 10 AND 11. WE ARE SORT OF SEEING THE NATURAL HISTORY OF UNTREATED TYPE 2 DIABETES EVOLVE IN FRONT OF US.    AT THIS POINT, SHE IS NOT TAKING INSULIN REGULARLY BECAUSE INSULIN IS CAUSING MUSCLE PAIN. THIS IS PROBABLY BECAUSE THE BOLUS INSULIN INJECTIONS ARE LOWERING HER BLOOD SUGAR AND POTASSIUM AND CAUSING SHIFTS OF THOSE CHEMICALS IN HER MUSCLES. MY THOUGHT IS THAT IF WE CAN GET HER BLOOD SUGARS LOWER AND  MORE STABLE, THEN THE MUSCLE CRAMPS OUGHT TO LEA OVER TIME.    THE PLAN IS TO ASSESS INSULIN SECRETORY CAPACITY. IF INDEED IT IS AS LOW AS I EXPECT, WE CAN TRY AFREZZA- INHALED INSULIN. IF THAT DOES NOT WORK WE COULD TRY INSULIN THROUGH A VGO DEVICE WHICH WOULD GIVE INSULIN AT A LOW DOSE CONTINUOUSLY.          Relevant Medications    glipiZIDE-metFORMIN (METAGLIP) 5-500 MG per tablet    Other Relevant Orders    POC Glucose, Blood (Completed)    POC Glycosylated Hemoglobin (Hb A1C) (Completed)    C-Peptide        Electronically signed by : Robin Hays MD   06/18/2025

## 2025-06-18 NOTE — ASSESSMENT & PLAN NOTE
SHE HAS DIABETES, COMPLICATED BY RETINOPATHY AND NEUROPATHY. HER DIABETES IS POORLY CONTROLLED .  IN LOOKING AT THE OLD RECORDS FROM OUR OLD PRACTICE WHEN SHE SAW [facility], SHE WAS NOT COMPLIANT WITH INSULIN BACK THEN EITHER AND IT LOOKS LIKE HER A1C WAS CONSISTENTLY HIGHER THAN 10 AND 11. WE ARE SORT OF SEEING THE NATURAL HISTORY OF UNTREATED TYPE 2 DIABETES EVOLVE IN FRONT OF US.    AT THIS POINT, SHE IS NOT TAKING INSULIN REGULARLY BECAUSE INSULIN IS CAUSING MUSCLE PAIN. THIS IS PROBABLY BECAUSE THE BOLUS INSULIN INJECTIONS ARE LOWERING HER BLOOD SUGAR AND POTASSIUM AND CAUSING SHIFTS OF THOSE CHEMICALS IN HER MUSCLES. MY THOUGHT IS THAT IF WE CAN GET HER BLOOD SUGARS LOWER AND  MORE STABLE, THEN THE MUSCLE CRAMPS OUGHT TO LEA OVER TIME.    THE PLAN IS TO ASSESS INSULIN SECRETORY CAPACITY. IF INDEED IT IS AS LOW AS I EXPECT, WE CAN TRY AFREZZA- INHALED INSULIN. IF THAT DOES NOT WORK WE COULD TRY INSULIN THROUGH A VGO DEVICE WHICH WOULD GIVE INSULIN AT A LOW DOSE CONTINUOUSLY.

## 2025-06-19 ENCOUNTER — TELEPHONE (OUTPATIENT)
Age: 70
End: 2025-06-19
Payer: MEDICARE

## 2025-06-19 NOTE — TELEPHONE ENCOUNTER
Spoke to patient and told her that the lab test could not be done.  Also told her Dr. Hays is going to call in inhaled insulin and pharmacist could show her how to use it.  Patient voiced understanding.

## 2025-06-23 ENCOUNTER — TRANSCRIBE ORDERS (OUTPATIENT)
Dept: ADMINISTRATIVE | Facility: HOSPITAL | Age: 70
End: 2025-06-23
Payer: MEDICARE

## 2025-06-23 DIAGNOSIS — Z12.31 VISIT FOR SCREENING MAMMOGRAM: Primary | ICD-10-CM

## 2025-06-30 ENCOUNTER — TELEPHONE (OUTPATIENT)
Dept: CARDIOLOGY | Facility: CLINIC | Age: 70
End: 2025-06-30
Payer: MEDICARE

## 2025-06-30 ENCOUNTER — TELEPHONE (OUTPATIENT)
Dept: ENDOCRINOLOGY | Facility: CLINIC | Age: 70
End: 2025-06-30
Payer: MEDICARE

## 2025-06-30 RX ORDER — EZETIMIBE 10 MG/1
10 TABLET ORAL DAILY
Qty: 30 TABLET | Refills: 3 | Status: SHIPPED | OUTPATIENT
Start: 2025-06-30

## 2025-06-30 RX ORDER — ISOSORBIDE MONONITRATE 60 MG/1
60 TABLET, EXTENDED RELEASE ORAL DAILY
Qty: 90 TABLET | Refills: 3 | Status: SHIPPED | OUTPATIENT
Start: 2025-06-30

## 2025-06-30 RX ORDER — CLOPIDOGREL BISULFATE 75 MG/1
75 TABLET ORAL DAILY
Qty: 90 TABLET | Refills: 4 | Status: SHIPPED | OUTPATIENT
Start: 2025-06-30

## 2025-06-30 RX ORDER — METOPROLOL TARTRATE 50 MG
50 TABLET ORAL 2 TIMES DAILY
Qty: 60 TABLET | Refills: 3 | Status: SHIPPED | OUTPATIENT
Start: 2025-06-30

## 2025-06-30 RX ORDER — INSULIN HUMAN 8 1/1
8 POWDER, METERED RESPIRATORY (INHALATION) 3 TIMES DAILY
Qty: 90 EACH | Refills: 3 | Status: SHIPPED | OUTPATIENT
Start: 2025-06-30

## 2025-06-30 RX ORDER — SACUBITRIL AND VALSARTAN 24; 26 MG/1; MG/1
1 TABLET, FILM COATED ORAL 2 TIMES DAILY
Qty: 60 TABLET | Refills: 3 | Status: SHIPPED | OUTPATIENT
Start: 2025-06-30

## 2025-06-30 NOTE — TELEPHONE ENCOUNTER
I had not sent it because I never heard back from her that that is what she wanted to do.  I will do so now    unknown

## 2025-06-30 NOTE — TELEPHONE ENCOUNTER
PT WAS CALLING ABOUT A PA, TOLD PT I DID NOT SEE ANYTHING SENT TO US TO FILL OUT. ADVISED PT TO CALL INSURANCE AND HAVE THEM RE-SEND THE PA. I AM NOT SURE WHICH MEDICATION THIS WAS FOR. PT ALSO WANTED TO KNOW IF AN INSULIN INHALER HAD BEEN CALLED IN AND SHE DID NOT KNOW THE NAME SO I WAS % SURE WHICH MEDICATION IT WOULD BE.

## 2025-06-30 NOTE — TELEPHONE ENCOUNTER
Pt LVM requesting plavix, beta blocker, zetia, entresto, imdur refills sent to [facility] in [place]. Per JERRI NEVAREZ 3/19/25 continue plavix, beta blocker, zetia, entresto, imdur  Lab Results   Component Value Date    GLUCOSE 347 (H) 11/16/2022    BUN 16 11/16/2022    CREATININE 0.76 11/16/2022     11/16/2022    K 4.2 11/16/2022     11/16/2022    CALCIUM 9.0 11/16/2022    BCR 21.1 11/16/2022    ANIONGAP 11.0 11/16/2022    EGFR 86.0 11/16/2022

## 2025-07-08 ENCOUNTER — TELEPHONE (OUTPATIENT)
Dept: ENDOCRINOLOGY | Facility: CLINIC | Age: 70
End: 2025-07-08

## 2025-07-10 ENCOUNTER — TELEPHONE (OUTPATIENT)
Dept: ENDOCRINOLOGY | Facility: CLINIC | Age: 70
End: 2025-07-10
Payer: MEDICARE

## 2025-07-10 RX ORDER — HYDROCHLOROTHIAZIDE 12.5 MG/1
CAPSULE ORAL
Qty: 2 EACH | Refills: 2 | Status: SHIPPED | OUTPATIENT
Start: 2025-07-10

## 2025-07-10 NOTE — TELEPHONE ENCOUNTER
Rx Refill Note  Requested Prescriptions     Pending Prescriptions Disp Refills    Continuous Glucose Sensor (FreeStyle Rhonda 3 Plus Sensor) 2 each 2     Sig: REPLACE SENSOR EVERY 15 DAYS.      Last office visit with prescribing clinician: 6/18/2025      Next office visit with prescribing clinician: 9/18/2025       Noreen Cardona MA  07/10/25, 13:14 EDT

## 2025-07-10 NOTE — TELEPHONE ENCOUNTER
[employer]NA[employer] (PT'S RX BENEFITS PLAN) CALLED REQUESTING WE REACH OUT TO THEM TO PROVIDE CLIINCAL INFO TO POSSIBLY COVER HER AFREZZA RX.     PHONE # [phone]  REF # [id number]

## 2025-07-11 ENCOUNTER — PRIOR AUTHORIZATION (OUTPATIENT)
Dept: ENDOCRINOLOGY | Facility: CLINIC | Age: 70
End: 2025-07-11
Payer: MEDICARE

## 2025-07-11 NOTE — TELEPHONE ENCOUNTER
Pily Martinez (Key: [id number])  PA Case ID #: [id number]  Rx #: [id number]  Need Help? Call us at [phone]  Outcome  Approved today by [employer] 2017  PA Case: [id number], Status: Approved, Coverage Starts on: 4/9/2025 12:00:00 AM, Coverage Ends on: 7/11/2026 12:00:00 AM.  Effective Date: 4/9/2025  Authorization Expiration Date: 7/11/2026  Drug  FreeStyle Rhonda 3 Plus Sensor  ePA cloud logo  Form  [employer] Electronic PA Form (2017 [employer])

## 2025-07-22 ENCOUNTER — HOSPITAL ENCOUNTER (OUTPATIENT)
Facility: HOSPITAL | Age: 70
Discharge: HOME | End: 2025-07-22
Admitting: INTERNAL MEDICINE
Payer: MEDICARE

## 2025-07-22 DIAGNOSIS — Z12.31 VISIT FOR SCREENING MAMMOGRAM: ICD-10-CM

## 2025-07-22 PROCEDURE — 77063 BREAST TOMOSYNTHESIS BI: CPT

## 2025-07-22 PROCEDURE — 77067 SCR MAMMO BI INCL CAD: CPT

## (undated) DEVICE — RADIFOCUS GLIDEWIRE ADVANTAGE TRACK GUIDE: Brand: GLIDEWIRE ADVANTAGE TRACK

## (undated) DEVICE — Device

## (undated) DEVICE — APPL CHLORAPREP 26ML CLR

## (undated) DEVICE — KT INTRO MINISTICK MAX W/GW NITNL/TUNG ECHO STFF 4F 21G 7CM

## (undated) DEVICE — DESTINATION RENAL GUIDING SHEATH: Brand: DESTINATION

## (undated) DEVICE — PINNACLE INTRODUCER SHEATH: Brand: PINNACLE

## (undated) DEVICE — CATH IMG IVUS VISIONS PV .018 3.4F

## (undated) DEVICE — CVR PROB ULTRASND/TRANSD W/GEL LNG 18X250CM STRL

## (undated) DEVICE — RADIFOCUS GLIDEWIRE ADVANTAGE TRACK GUIDE WIRE: Brand: GLIDEWIRE ADVANTAGE TRACK

## (undated) DEVICE — GLV SURG BIOGEL LTX PF 7 1/2

## (undated) DEVICE — PERIPHERAL SHIELD-ORANGE: Brand: RADPAD

## (undated) DEVICE — SYR CONTRL LUERLOK 10CC

## (undated) DEVICE — NDL HYPO ECLPS SFTY 22G 1 1/2IN

## (undated) DEVICE — CATH OMNI FLUSH 5FR

## (undated) DEVICE — UNDERGLV SURG BIOGEL INDICAT PI SZ8 BLU

## (undated) DEVICE — SNAP KOVER: Brand: UNBRANDED

## (undated) DEVICE — SYR LL BD 10CC

## (undated) DEVICE — CVR PROB ULTRASND/TRANSD W/GEL 18X120CM STRL

## (undated) DEVICE — PK ANGIO OR 10

## (undated) DEVICE — ADHS SKIN PREMIERPRO EXOFIN TOPICAL HI/VISC .5ML

## (undated) DEVICE — RADIFOCUS GLIDEWIRE ADVANTAGE GUIDEWIRE: Brand: GLIDEWIRE ADVANTAGE

## (undated) DEVICE — INFLATION DEVICE: Brand: ENCORE™ 26

## (undated) DEVICE — CATH SZ ACCUVU SEG/20CM OMNI 5F 70CM

## (undated) DEVICE — SYRINGE, LOCKING, 10CC, STERILE: Brand: BABY GORILLA/GORILLA PLATING SYSTEM

## (undated) DEVICE — CVR HNDL LIGHT RIGID